# Patient Record
Sex: FEMALE | Race: WHITE | Employment: FULL TIME | URBAN - METROPOLITAN AREA
[De-identification: names, ages, dates, MRNs, and addresses within clinical notes are randomized per-mention and may not be internally consistent; named-entity substitution may affect disease eponyms.]

---

## 2017-02-24 ENCOUNTER — HOSPITAL ENCOUNTER (OUTPATIENT)
Dept: MAMMOGRAPHY | Age: 42
Discharge: HOME | End: 2017-02-24
Attending: INTERNAL MEDICINE
Payer: COMMERCIAL

## 2017-02-24 ENCOUNTER — HOSPITAL ENCOUNTER (OUTPATIENT)
Dept: ULTRASOUND IMAGING | Age: 42
Discharge: HOME | End: 2017-02-24
Attending: INTERNAL MEDICINE
Payer: COMMERCIAL

## 2017-02-24 DIAGNOSIS — N63.0 BREAST NODULE: ICD-10-CM

## 2017-02-24 PROCEDURE — 77066 DX MAMMO INCL CAD BI: CPT

## 2017-02-24 PROCEDURE — 77062 BREAST TOMOSYNTHESIS BI: CPT

## 2017-02-24 PROCEDURE — 76642 ULTRASOUND BREAST LIMITED: CPT

## 2017-03-02 ENCOUNTER — HOSPITAL ENCOUNTER (OUTPATIENT)
Dept: MAMMOGRAPHY | Age: 42
Discharge: HOME | End: 2017-03-02
Attending: INTERNAL MEDICINE
Payer: COMMERCIAL

## 2017-03-02 ENCOUNTER — HOSPITAL ENCOUNTER (OUTPATIENT)
Dept: ULTRASOUND IMAGING | Age: 42
Discharge: HOME | End: 2017-03-02
Attending: INTERNAL MEDICINE
Payer: COMMERCIAL

## 2017-03-02 DIAGNOSIS — N63.0 BREAST NODULE: ICD-10-CM

## 2017-03-02 PROCEDURE — 88305 TISSUE EXAM BY PATHOLOGIST: CPT | Performed by: INTERNAL MEDICINE

## 2017-03-02 PROCEDURE — 19083 BX BREAST 1ST LESION US IMAG: CPT

## 2017-03-02 PROCEDURE — 77065 DX MAMMO INCL CAD UNI: CPT

## 2017-03-02 NOTE — PROCEDURES
[facility] HOSP - [facility]  Procedure Note    Andrzej Funk Patient Status:  Outpatient    1975 MRN [id number]   Location [facility] [place] Attending Erwin Bray MD   Hosp Day # 0 PCP Inder Rankin MD     Procedure: Arelia Blessing

## 2017-03-06 ENCOUNTER — TELEPHONE (OUTPATIENT)
Dept: MAMMOGRAPHY | Facility: HOSPITAL | Age: 42
End: 2017-03-06

## 2017-03-06 NOTE — TELEPHONE ENCOUNTER
Telephoned patient regarding negative breast biopsy results. Biopsy site is healing well. Hematoma management discussed. Radiologist recommendation is to get another mammogram in 6 months for follow-up.  All questions answered and pt verbalized understand

## 2017-07-31 ENCOUNTER — OFFICE VISIT (OUTPATIENT)
Dept: INTERNAL MEDICINE CLINIC | Facility: CLINIC | Age: 42
End: 2017-07-31

## 2017-07-31 VITALS
RESPIRATION RATE: 18 BRPM | BODY MASS INDEX: 21.17 KG/M2 | TEMPERATURE: 98 F | DIASTOLIC BLOOD PRESSURE: 82 MMHG | SYSTOLIC BLOOD PRESSURE: 108 MMHG | HEIGHT: 62.5 IN | WEIGHT: 118 LBS | HEART RATE: 86 BPM

## 2017-07-31 DIAGNOSIS — Z23 NEED FOR TDAP VACCINATION: ICD-10-CM

## 2017-07-31 DIAGNOSIS — Z00.00 LABORATORY EXAMINATION ORDERED AS PART OF A ROUTINE GENERAL MEDICAL EXAMINATION: ICD-10-CM

## 2017-07-31 DIAGNOSIS — Z13.0 SCREENING, ANEMIA, DEFICIENCY, IRON: ICD-10-CM

## 2017-07-31 DIAGNOSIS — Z13.89 SCREENING FOR GENITOURINARY CONDITION: ICD-10-CM

## 2017-07-31 DIAGNOSIS — Z00.00 PHYSICAL EXAM, ANNUAL: Primary | ICD-10-CM

## 2017-07-31 DIAGNOSIS — K52.9 CHRONIC DIARRHEA: ICD-10-CM

## 2017-07-31 DIAGNOSIS — Z13.220 LIPID SCREENING: ICD-10-CM

## 2017-07-31 DIAGNOSIS — Z13.29 THYROID DISORDER SCREEN: ICD-10-CM

## 2017-07-31 PROBLEM — K90.0 CELIAC DISEASE: Status: ACTIVE | Noted: 2017-07-31

## 2017-07-31 PROBLEM — K90.0 CELIAC DISEASE (HCC): Status: ACTIVE | Noted: 2017-07-31

## 2017-07-31 PROCEDURE — 99395 PREV VISIT EST AGE 18-39: CPT | Performed by: INTERNAL MEDICINE

## 2017-07-31 PROCEDURE — 90471 IMMUNIZATION ADMIN: CPT | Performed by: INTERNAL MEDICINE

## 2017-07-31 PROCEDURE — 90715 TDAP VACCINE 7 YRS/> IM: CPT | Performed by: INTERNAL MEDICINE

## 2017-07-31 RX ORDER — CHOLESTYRAMINE 4 G/9G
4 POWDER, FOR SUSPENSION ORAL DAILY PRN
Qty: 90 EACH | Refills: 0 | Status: SHIPPED | OUTPATIENT
Start: 2017-07-31 | End: 2017-08-30

## 2017-07-31 NOTE — PROGRESS NOTES
HPI:    Patient ID: Mela Sorenson is a 46year old female. HPI  HPI:   Mela Sorenson is a 46year old female who presents for a complete physical exam. Symptoms: denies discharge, itching, burning or dysuria, periods are regular.  Patient file to calculate BMI.    GENERAL: well developed, well nourished,in no apparent distress  SKIN: no rashes,no suspicious lesions  HEENT: atraumatic, normocephalic,ears and throat are clear  EYES:PERRLA, EOMI, normal optic disk,conjunctiva are clear  NECK: s

## 2017-08-02 ENCOUNTER — LAB ENCOUNTER (OUTPATIENT)
Dept: LAB | Age: 42
End: 2017-08-02
Attending: INTERNAL MEDICINE
Payer: COMMERCIAL

## 2017-08-02 DIAGNOSIS — Z00.00 LABORATORY EXAMINATION ORDERED AS PART OF A ROUTINE GENERAL MEDICAL EXAMINATION: ICD-10-CM

## 2017-08-02 DIAGNOSIS — Z13.89 SCREENING FOR GENITOURINARY CONDITION: ICD-10-CM

## 2017-08-02 DIAGNOSIS — Z13.29 THYROID DISORDER SCREEN: ICD-10-CM

## 2017-08-02 DIAGNOSIS — Z13.220 LIPID SCREENING: ICD-10-CM

## 2017-08-02 DIAGNOSIS — R79.89 ELEVATED LFTS: ICD-10-CM

## 2017-08-02 DIAGNOSIS — K52.9 CHRONIC DIARRHEA: ICD-10-CM

## 2017-08-02 DIAGNOSIS — Z13.0 SCREENING, ANEMIA, DEFICIENCY, IRON: ICD-10-CM

## 2017-08-02 LAB
ALBUMIN SERPL-MCNC: 3.4 G/DL (ref 3.5–4.8)
ALP LIVER SERPL-CCNC: 113 U/L (ref 37–98)
ALT SERPL-CCNC: 109 U/L (ref 14–54)
AST SERPL-CCNC: 65 U/L (ref 15–41)
BASOPHILS # BLD AUTO: 0.03 X10(3) UL (ref 0–0.1)
BASOPHILS NFR BLD AUTO: 0.6 %
BILIRUB SERPL-MCNC: 0.3 MG/DL (ref 0.1–2)
BILIRUB UR QL STRIP.AUTO: NEGATIVE
BUN BLD-MCNC: 6 MG/DL (ref 8–20)
CALCIUM BLD-MCNC: 8.8 MG/DL (ref 8.3–10.3)
CHLORIDE: 102 MMOL/L (ref 101–111)
CHOLEST SMN-MCNC: 141 MG/DL (ref ?–200)
CLARITY UR REFRACT.AUTO: CLEAR
CO2: 30 MMOL/L (ref 22–32)
COLOR UR AUTO: YELLOW
CREAT BLD-MCNC: 0.66 MG/DL (ref 0.55–1.02)
EOSINOPHIL # BLD AUTO: 0.04 X10(3) UL (ref 0–0.3)
EOSINOPHIL NFR BLD AUTO: 0.8 %
ERYTHROCYTE [DISTWIDTH] IN BLOOD BY AUTOMATED COUNT: 12.7 % (ref 11.5–16)
EST. AVERAGE GLUCOSE BLD GHB EST-MCNC: 97 MG/DL (ref 68–126)
GLUCOSE BLD-MCNC: 80 MG/DL (ref 70–99)
GLUCOSE UR STRIP.AUTO-MCNC: NEGATIVE MG/DL
HBA1C MFR BLD HPLC: 5 % (ref ?–5.7)
HCT VFR BLD AUTO: 41.6 % (ref 34–50)
HDLC SERPL-MCNC: 60 MG/DL (ref 45–?)
HDLC SERPL: 2.35 {RATIO} (ref ?–4.44)
HGB BLD-MCNC: 13.5 G/DL (ref 12–16)
IMMATURE GRANULOCYTE COUNT: 0.01 X10(3) UL (ref 0–1)
IMMATURE GRANULOCYTE RATIO %: 0.2 %
IMMUNOGLOBULIN A: 147 MG/DL (ref 70–312)
KETONES UR STRIP.AUTO-MCNC: NEGATIVE MG/DL
LDLC SERPL CALC-MCNC: 64 MG/DL (ref ?–130)
LDLC SERPL-MCNC: 17 MG/DL (ref 5–40)
LEUKOCYTE ESTERASE UR QL STRIP.AUTO: NEGATIVE
LYMPHOCYTES # BLD AUTO: 1.45 X10(3) UL (ref 0.9–4)
LYMPHOCYTES NFR BLD AUTO: 30.5 %
M PROTEIN MFR SERPL ELPH: 6.8 G/DL (ref 6.1–8.3)
MCH RBC QN AUTO: 29 PG (ref 27–33.2)
MCHC RBC AUTO-ENTMCNC: 32.5 G/DL (ref 31–37)
MCV RBC AUTO: 89.3 FL (ref 81–100)
MONOCYTES # BLD AUTO: 0.58 X10(3) UL (ref 0.1–0.6)
MONOCYTES NFR BLD AUTO: 12.2 %
NEUTROPHIL ABS PRELIM: 2.65 X10 (3) UL (ref 1.3–6.7)
NEUTROPHILS # BLD AUTO: 2.65 X10(3) UL (ref 1.3–6.7)
NEUTROPHILS NFR BLD AUTO: 55.7 %
NITRITE UR QL STRIP.AUTO: NEGATIVE
NONHDLC SERPL-MCNC: 81 MG/DL (ref ?–130)
PH UR STRIP.AUTO: 7 [PH] (ref 4.5–8)
PLATELET # BLD AUTO: 206 10(3)UL (ref 150–450)
POTASSIUM SERPL-SCNC: 3.6 MMOL/L (ref 3.6–5.1)
PROT UR STRIP.AUTO-MCNC: NEGATIVE MG/DL
RBC # BLD AUTO: 4.66 X10(6)UL (ref 3.8–5.1)
RED CELL DISTRIBUTION WIDTH-SD: 41.3 FL (ref 35.1–46.3)
SED RATE-ML: 10 MM/HR (ref 0–25)
SODIUM SERPL-SCNC: 139 MMOL/L (ref 136–144)
SP GR UR STRIP.AUTO: 1.01 (ref 1–1.03)
TRIGLYCERIDES: 84 MG/DL (ref ?–150)
TSI SER-ACNC: 1.2 MIU/ML (ref 0.35–5.5)
UROBILINOGEN UR STRIP.AUTO-MCNC: <2 MG/DL
WBC # BLD AUTO: 4.8 X10(3) UL (ref 4–13)

## 2017-08-02 PROCEDURE — 83735 ASSAY OF MAGNESIUM: CPT

## 2017-08-02 PROCEDURE — 80061 LIPID PANEL: CPT

## 2017-08-02 PROCEDURE — 84999 UNLISTED CHEMISTRY PROCEDURE: CPT

## 2017-08-02 PROCEDURE — 82784 ASSAY IGA/IGD/IGG/IGM EACH: CPT

## 2017-08-02 PROCEDURE — 82705 FATS/LIPIDS FECES QUAL: CPT

## 2017-08-02 PROCEDURE — 36415 COLL VENOUS BLD VENIPUNCTURE: CPT

## 2017-08-02 PROCEDURE — 84302 ASSAY OF SWEAT SODIUM: CPT

## 2017-08-02 PROCEDURE — 83036 HEMOGLOBIN GLYCOSYLATED A1C: CPT

## 2017-08-02 PROCEDURE — 81001 URINALYSIS AUTO W/SCOPE: CPT

## 2017-08-02 PROCEDURE — 83516 IMMUNOASSAY NONANTIBODY: CPT

## 2017-08-02 PROCEDURE — 85025 COMPLETE CBC W/AUTO DIFF WBC: CPT

## 2017-08-02 PROCEDURE — 89055 LEUKOCYTE ASSESSMENT FECAL: CPT

## 2017-08-02 PROCEDURE — 86255 FLUORESCENT ANTIBODY SCREEN: CPT

## 2017-08-02 PROCEDURE — 82438 ASSAY OTHER FLUID CHLORIDES: CPT

## 2017-08-02 PROCEDURE — 82977 ASSAY OF GGT: CPT

## 2017-08-02 PROCEDURE — 84443 ASSAY THYROID STIM HORMONE: CPT

## 2017-08-02 PROCEDURE — 80053 COMPREHEN METABOLIC PANEL: CPT

## 2017-08-02 PROCEDURE — 85652 RBC SED RATE AUTOMATED: CPT

## 2017-08-03 ENCOUNTER — TELEPHONE (OUTPATIENT)
Dept: INTERNAL MEDICINE CLINIC | Facility: CLINIC | Age: 42
End: 2017-08-03

## 2017-08-03 DIAGNOSIS — R79.89 ELEVATED LFTS: Primary | ICD-10-CM

## 2017-08-03 LAB
GAMMA GLUTAMYL TRANSFERASE: 54 U/L (ref 5–55)
GLIAD (DEAMIDATED) AB, IGA: NEGATIVE
GLIAD (DEAMIDATED) AB, IGG: NEGATIVE
TISSUE TRANSGLUTAMINASE AB,IGA: 1.2 U/ML (ref ?–15)
TISSUE TRANSGLUTAMINASE IGA QUALITATIVE: NEGATIVE

## 2017-08-03 NOTE — TELEPHONE ENCOUNTER
Spoke with patient and she is requesting a referral for a different GI group. States the soonest [facility] can get her [facility] was end of August, and she does not want to miss days off work. States they do not have evening appointments either.      Information

## 2017-08-03 NOTE — TELEPHONE ENCOUNTER
Spoke with Princess WHITLEY added on. D/w pt results and recommendations. She expressed understanding. Orders placed.

## 2017-08-03 NOTE — TELEPHONE ENCOUNTER
----- Message from Royal Galindo MD sent at 8/3/2017 10:55 AM CDT -----  Sed rate is normal suggesting no inflammation  Cbc is normal, reflects no infection  UA us bland  No d2. Renal functions are normal  Elevated LFT. Check ggt.  Have pt keep appt with GI

## 2017-08-03 NOTE — TELEPHONE ENCOUNTER
Patient called and stated that she was seen by Dr Leann Macias, and the directions that were given based on her test results are not working. Please advise.

## 2017-08-04 LAB
NEUTRAL FAT, FECAL: NORMAL
SPLIT FAT, FECAL: NORMAL

## 2017-08-05 ENCOUNTER — HOSPITAL ENCOUNTER (OUTPATIENT)
Dept: ULTRASOUND IMAGING | Age: 42
Discharge: HOME | End: 2017-08-05
Attending: INTERNAL MEDICINE
Payer: COMMERCIAL

## 2017-08-05 DIAGNOSIS — R79.89 ELEVATED LFTS: ICD-10-CM

## 2017-08-05 PROCEDURE — 76700 US EXAM ABDOM COMPLETE: CPT | Performed by: INTERNAL MEDICINE

## 2017-08-07 ENCOUNTER — TELEPHONE (OUTPATIENT)
Dept: INTERNAL MEDICINE CLINIC | Facility: CLINIC | Age: 42
End: 2017-08-07

## 2017-08-07 DIAGNOSIS — K52.9 CHRONIC DIARRHEA: Primary | ICD-10-CM

## 2017-08-07 NOTE — TELEPHONE ENCOUNTER
Per Dr. Rubi Mark repeat electrolytes stool with watery diarrhea sample. D/w pt she expressed understanding. Order placed.

## 2017-08-07 NOTE — TELEPHONE ENCOUNTER
----- Message from Miles Randall MD sent at 8/5/2017 11:41 AM CDT -----  Neg for celiac  Stool lytes are pending

## 2017-08-09 ENCOUNTER — APPOINTMENT (OUTPATIENT)
Dept: LAB | Age: 42
End: 2017-08-09
Attending: INTERNAL MEDICINE
Payer: COMMERCIAL

## 2017-08-09 DIAGNOSIS — K52.9 CHRONIC DIARRHEA: ICD-10-CM

## 2017-08-09 PROCEDURE — 83735 ASSAY OF MAGNESIUM: CPT

## 2017-08-09 PROCEDURE — 81383 HLA II TYPING 1 ALLELE HR: CPT | Performed by: INTERNAL MEDICINE

## 2017-08-09 PROCEDURE — 84999 UNLISTED CHEMISTRY PROCEDURE: CPT

## 2017-08-09 PROCEDURE — 82438 ASSAY OTHER FLUID CHLORIDES: CPT

## 2017-08-09 PROCEDURE — 81376 HLA II TYPING 1 LOCUS LR: CPT | Performed by: INTERNAL MEDICINE

## 2017-08-09 PROCEDURE — 84302 ASSAY OF SWEAT SODIUM: CPT

## 2017-08-10 RX ORDER — SODIUM CHLORIDE, SODIUM LACTATE, POTASSIUM CHLORIDE, CALCIUM CHLORIDE 600; 310; 30; 20 MG/100ML; MG/100ML; MG/100ML; MG/100ML
INJECTION, SOLUTION INTRAVENOUS CONTINUOUS
Status: CANCELLED | OUTPATIENT
Start: 2017-08-10

## 2017-08-11 ENCOUNTER — HOSPITAL ENCOUNTER (OUTPATIENT)
Facility: HOSPITAL | Age: 42
Setting detail: HOSPITAL OUTPATIENT SURGERY
Discharge: HOME | End: 2017-08-11
Attending: INTERNAL MEDICINE | Admitting: INTERNAL MEDICINE
Payer: COMMERCIAL

## 2017-08-11 ENCOUNTER — SURGERY (OUTPATIENT)
Age: 42
End: 2017-08-11

## 2017-08-11 VITALS
TEMPERATURE: 98 F | HEART RATE: 62 BPM | SYSTOLIC BLOOD PRESSURE: 99 MMHG | WEIGHT: 118 LBS | OXYGEN SATURATION: 100 % | DIASTOLIC BLOOD PRESSURE: 61 MMHG | RESPIRATION RATE: 16 BRPM | BODY MASS INDEX: 20.91 KG/M2 | HEIGHT: 63 IN

## 2017-08-11 DIAGNOSIS — K52.9 CHRONIC DIARRHEA: ICD-10-CM

## 2017-08-11 DIAGNOSIS — K90.0 CELIAC DISEASE: ICD-10-CM

## 2017-08-11 LAB
CHLORIDE, STOOL: 29 MMOL/L
POCT LOT NUMBER: NORMAL
POCT URINE PREGNANCY: NEGATIVE
POTASSIUM, FECAL: 75 MMOL/L
PROCEDURE CONTROL: NORMAL
SODIUM, FECAL: 66 MMOL/L

## 2017-08-11 PROCEDURE — 88305 TISSUE EXAM BY PATHOLOGIST: CPT | Performed by: INTERNAL MEDICINE

## 2017-08-11 PROCEDURE — 0DB58ZX EXCISION OF ESOPHAGUS, VIA NATURAL OR ARTIFICIAL OPENING ENDOSCOPIC, DIAGNOSTIC: ICD-10-PCS | Performed by: INTERNAL MEDICINE

## 2017-08-11 PROCEDURE — 0DB68ZX EXCISION OF STOMACH, VIA NATURAL OR ARTIFICIAL OPENING ENDOSCOPIC, DIAGNOSTIC: ICD-10-PCS | Performed by: INTERNAL MEDICINE

## 2017-08-11 PROCEDURE — 81025 URINE PREGNANCY TEST: CPT | Performed by: INTERNAL MEDICINE

## 2017-08-11 PROCEDURE — 0DBE8ZX EXCISION OF LARGE INTESTINE, VIA NATURAL OR ARTIFICIAL OPENING ENDOSCOPIC, DIAGNOSTIC: ICD-10-PCS | Performed by: INTERNAL MEDICINE

## 2017-08-11 PROCEDURE — 0DB98ZX EXCISION OF DUODENUM, VIA NATURAL OR ARTIFICIAL OPENING ENDOSCOPIC, DIAGNOSTIC: ICD-10-PCS | Performed by: INTERNAL MEDICINE

## 2017-08-11 RX ORDER — SODIUM CHLORIDE, SODIUM LACTATE, POTASSIUM CHLORIDE, CALCIUM CHLORIDE 600; 310; 30; 20 MG/100ML; MG/100ML; MG/100ML; MG/100ML
INJECTION, SOLUTION INTRAVENOUS CONTINUOUS
Status: DISCONTINUED | OUTPATIENT
Start: 2017-08-11 | End: 2017-08-11

## 2017-08-11 RX ORDER — MIDAZOLAM HYDROCHLORIDE 1 MG/ML
INJECTION INTRAMUSCULAR; INTRAVENOUS
Status: DISCONTINUED | OUTPATIENT
Start: 2017-08-11 | End: 2017-08-11

## 2017-08-11 NOTE — OPERATIVE REPORT
PATIENT NAME: Shreyas Gonzalez  MRN: [id number]  DATE OF OPERATION: 8/11/2017  REFERRING PHYSICIAN: Dr. Evon Davenport  Medications:  Versed 7 mg IVP              Fentanyl 75 mcg IVP  DATE OF LAST COLONOSCOPY:  none  PREOPERATIVE DIAGNOSIS:  Abdominal pain  HX of kaylin disease. Biopsies were done. The gastroscope was removed from the patient. The procedure was completed. The patient tolerated the procedure well. The patient was turned around and a colonoscopy was performed.   An awake rectal exam was normal with nor

## 2017-08-11 NOTE — H&P
H and P Update    The history obtained by Dr. Yohannes Lundberg dated 8/9/17, has no changes.     GENERAL: well developed, well nourished, in no apparent distress  HEENT: atraumatic, normocephalic, ears and throat are clear  NECK: supple,

## 2017-08-14 ENCOUNTER — TELEPHONE (OUTPATIENT)
Dept: INTERNAL MEDICINE CLINIC | Facility: CLINIC | Age: 42
End: 2017-08-14

## 2017-08-14 DIAGNOSIS — K52.9 CHRONIC DIARRHEA: Primary | ICD-10-CM

## 2017-08-14 NOTE — TELEPHONE ENCOUNTER
----- Message from Joan Araujo MD sent at 8/12/2017  8:10 AM CDT -----  Stool osmolarity is less than 50 (calculated hers is 8). This points to a secretory cause of diarrhea. Has her diarrhea improved with cholestyramine?   If it hasn't then check gastrin

## 2017-08-14 NOTE — TELEPHONE ENCOUNTER
Spoke with patient and she states she followed up with GI and she was instructed to not take the Cholestyramine but rather take the imodium as needed.  She had an endoscopy/colonoscopy and it was normal. States gluten free diet makes symptoms better, but sh

## 2017-08-15 NOTE — PROGRESS NOTES
The colon and stomach biopsies were normal.  The esophageal biopsies show patchy areas of inflammation with up to 25 eosinophils per high power field. These changes are likely related to gastroesophageal reflux rather than eosinophilic esophagitis.     The

## 2017-08-19 ENCOUNTER — APPOINTMENT (OUTPATIENT)
Dept: LAB | Facility: HOSPITAL | Age: 42
End: 2017-08-19
Attending: INTERNAL MEDICINE
Payer: COMMERCIAL

## 2017-08-19 DIAGNOSIS — K52.9 CHRONIC DIARRHEA: ICD-10-CM

## 2017-08-19 PROCEDURE — 84586 ASSAY OF VIP: CPT

## 2017-08-19 PROCEDURE — 82941 ASSAY OF GASTRIN: CPT

## 2017-08-19 PROCEDURE — 84307 ASSAY OF SOMATOSTATIN: CPT

## 2017-08-19 PROCEDURE — 82308 ASSAY OF CALCITONIN: CPT

## 2017-08-19 PROCEDURE — 36415 COLL VENOUS BLD VENIPUNCTURE: CPT

## 2017-08-20 LAB
CALCITONIN: <2 PG/ML
GASTRIN LEVEL: 14 PG/ML

## 2017-09-02 LAB — Lab: 21 PG/ML

## 2017-09-11 ENCOUNTER — HOSPITAL ENCOUNTER (OUTPATIENT)
Dept: MAMMOGRAPHY | Age: 42
Discharge: HOME | End: 2017-09-11
Attending: INTERNAL MEDICINE
Payer: COMMERCIAL

## 2017-09-11 DIAGNOSIS — Z87.898 HISTORY OF LUMP IN BREAST: ICD-10-CM

## 2017-09-11 DIAGNOSIS — R92.8 ABNORMAL MAMMOGRAM: ICD-10-CM

## 2017-09-11 LAB — VASOACTIVE INTESTINAL POLYPEPTIDE: <13 PG/ML

## 2017-09-11 PROCEDURE — 77065 DX MAMMO INCL CAD UNI: CPT | Performed by: INTERNAL MEDICINE

## 2017-09-11 PROCEDURE — 77061 BREAST TOMOSYNTHESIS UNI: CPT | Performed by: INTERNAL MEDICINE

## 2017-09-11 NOTE — PROGRESS NOTES
Per hippa, left detailed message for pt informing of results wnl. Pt is to call the office back with any further questions or concerns.

## 2018-09-13 ENCOUNTER — OFFICE VISIT (OUTPATIENT)
Dept: INTERNAL MEDICINE CLINIC | Facility: CLINIC | Age: 43
End: 2018-09-13

## 2018-09-13 VITALS
TEMPERATURE: 98 F | HEART RATE: 76 BPM | BODY MASS INDEX: 20.32 KG/M2 | RESPIRATION RATE: 16 BRPM | OXYGEN SATURATION: 98 % | HEIGHT: 64 IN | WEIGHT: 119 LBS | SYSTOLIC BLOOD PRESSURE: 116 MMHG | DIASTOLIC BLOOD PRESSURE: 68 MMHG

## 2018-09-13 DIAGNOSIS — F34.1 DYSTHYMIA: ICD-10-CM

## 2018-09-13 DIAGNOSIS — Z13.89 SCREENING FOR GENITOURINARY CONDITION: ICD-10-CM

## 2018-09-13 DIAGNOSIS — Z00.00 ANNUAL PHYSICAL EXAM: Primary | ICD-10-CM

## 2018-09-13 DIAGNOSIS — Z28.21 INFLUENZA VACCINATION DECLINED BY PATIENT: ICD-10-CM

## 2018-09-13 DIAGNOSIS — Z00.00 LABORATORY EXAMINATION ORDERED AS PART OF A ROUTINE GENERAL MEDICAL EXAMINATION: ICD-10-CM

## 2018-09-13 DIAGNOSIS — Z13.29 THYROID DISORDER SCREEN: ICD-10-CM

## 2018-09-13 DIAGNOSIS — Z13.0 SCREENING, IRON DEFICIENCY ANEMIA: ICD-10-CM

## 2018-09-13 DIAGNOSIS — R60.0 BILATERAL LEG EDEMA: ICD-10-CM

## 2018-09-13 DIAGNOSIS — Z13.220 LIPID SCREENING: ICD-10-CM

## 2018-09-13 DIAGNOSIS — K20.0 EOSINOPHILIC ESOPHAGITIS: ICD-10-CM

## 2018-09-13 PROBLEM — K52.9 CHRONIC DIARRHEA: Status: RESOLVED | Noted: 2017-07-31 | Resolved: 2018-09-13

## 2018-09-13 PROCEDURE — 99386 PREV VISIT NEW AGE 40-64: CPT | Performed by: INTERNAL MEDICINE

## 2018-09-13 RX ORDER — HYDROCHLOROTHIAZIDE 12.5 MG/1
12.5 TABLET ORAL DAILY
Qty: 30 TABLET | Refills: 0 | Status: SHIPPED | OUTPATIENT
Start: 2018-09-13 | End: 2018-10-10

## 2018-09-13 RX ORDER — ESCITALOPRAM OXALATE 10 MG/1
10 TABLET ORAL DAILY
Qty: 30 TABLET | Refills: 0 | Status: SHIPPED | OUTPATIENT
Start: 2018-09-13 | End: 2018-10-08

## 2018-09-13 NOTE — PROGRESS NOTES
HPI:    Patient ID: Valentina Veliz is a 38year old female. HPI  HPI:   Valentina Veliz is a 38year old female who presents for a complete physical exam. Symptoms: denies discharge, itching, burning or dysuria, no menses due to Mirena IUD.  Patient History:  , :       Comment:  two  2017: COLONOSCOPY; N/A      Comment:  Procedure: COLONOSCOPY;  Surgeon: Adan Bumpers, MD;                 Location: [address] [place] [address] ENDOSCOPY  2017: COLONOSCOPY; N/A      Comment:  Performed by Romero Moya atraumatic, normocephalic,ears and throat are clear  EYES:PERRLA, EOMI, normal optic disk,conjunctiva are clear  NECK: supple,no adenopathy,no bruits  LUNGS: clear to auscultation  CARDIO: RRR without murmur  GI: good BS's,no masses, HSM or tenderness  : ASSESSMENT/PLAN:   Lipid screening  Screening for genitourinary condition  Annual physical exam  (primary encounter diagnosis)  Screening, iron deficiency anemia  Laboratory examination ordered as part of a routine general medical examination  Thyroid di

## 2018-09-13 NOTE — PATIENT INSTRUCTIONS
Prevention Guidelines, Women Ages 36 to 52  Screening tests and vaccines are an important part of managing your health. A screening test is done to find possible disorders or diseases in people who don't have any symptoms.  The goal is to find a disease e Depression All women in this age group At routine exams   Gonorrhea Sexually active women at increased risk for infection At routine exams   Hepatitis C Anyone at increased risk; 1 time for those born between [facility] At routine exams   High cholester Meningococcal Women at increased risk for infection–talk with your healthcare provider 1 or more doses   Pneumococcal conjugate vaccine (PCV13) and pneumococcal polysaccharide vaccine (PPSV23) Women at increased risk for infection–talk with your healthcare

## 2018-09-15 ENCOUNTER — LAB ENCOUNTER (OUTPATIENT)
Dept: LAB | Age: 43
End: 2018-09-15
Attending: INTERNAL MEDICINE
Payer: COMMERCIAL

## 2018-09-15 DIAGNOSIS — Z13.220 LIPID SCREENING: ICD-10-CM

## 2018-09-15 DIAGNOSIS — Z00.00 LABORATORY EXAMINATION ORDERED AS PART OF A ROUTINE GENERAL MEDICAL EXAMINATION: ICD-10-CM

## 2018-09-15 DIAGNOSIS — Z13.29 THYROID DISORDER SCREEN: ICD-10-CM

## 2018-09-15 DIAGNOSIS — Z13.89 SCREENING FOR GENITOURINARY CONDITION: ICD-10-CM

## 2018-09-15 DIAGNOSIS — Z13.0 SCREENING, IRON DEFICIENCY ANEMIA: ICD-10-CM

## 2018-09-15 LAB
ALBUMIN SERPL-MCNC: 4 G/DL (ref 3.5–4.8)
ALBUMIN/GLOB SERPL: 1.1 {RATIO} (ref 1–2)
ALP LIVER SERPL-CCNC: 87 U/L (ref 37–98)
ALT SERPL-CCNC: 18 U/L (ref 14–54)
ANION GAP SERPL CALC-SCNC: 4 MMOL/L (ref 0–18)
AST SERPL-CCNC: 15 U/L (ref 15–41)
BASOPHILS # BLD AUTO: 0.02 X10(3) UL (ref 0–0.1)
BASOPHILS NFR BLD AUTO: 0.3 %
BILIRUB SERPL-MCNC: 0.5 MG/DL (ref 0.1–2)
BILIRUB UR QL STRIP.AUTO: NEGATIVE
BUN BLD-MCNC: 12 MG/DL (ref 8–20)
BUN/CREAT SERPL: 13.8 (ref 10–20)
CALCIUM BLD-MCNC: 9.1 MG/DL (ref 8.3–10.3)
CHLORIDE SERPL-SCNC: 105 MMOL/L (ref 101–111)
CHOLEST SMN-MCNC: 158 MG/DL (ref ?–200)
CO2 SERPL-SCNC: 29 MMOL/L (ref 22–32)
COLOR UR AUTO: YELLOW
CREAT BLD-MCNC: 0.87 MG/DL (ref 0.55–1.02)
EOSINOPHIL # BLD AUTO: 0.06 X10(3) UL (ref 0–0.3)
EOSINOPHIL NFR BLD AUTO: 0.8 %
ERYTHROCYTE [DISTWIDTH] IN BLOOD BY AUTOMATED COUNT: 12.4 % (ref 11.5–16)
EST. AVERAGE GLUCOSE BLD GHB EST-MCNC: 97 MG/DL (ref 68–126)
GLOBULIN PLAS-MCNC: 3.5 G/DL (ref 2.5–4)
GLUCOSE BLD-MCNC: 88 MG/DL (ref 70–99)
GLUCOSE UR STRIP.AUTO-MCNC: NEGATIVE MG/DL
HBA1C MFR BLD HPLC: 5 % (ref ?–5.7)
HCT VFR BLD AUTO: 47 % (ref 34–50)
HDLC SERPL-MCNC: 65 MG/DL (ref 40–59)
HGB BLD-MCNC: 15 G/DL (ref 12–16)
IMMATURE GRANULOCYTE COUNT: 0.02 X10(3) UL (ref 0–1)
IMMATURE GRANULOCYTE RATIO %: 0.3 %
KETONES UR STRIP.AUTO-MCNC: NEGATIVE MG/DL
LDLC SERPL CALC-MCNC: 83 MG/DL (ref ?–100)
LEUKOCYTE ESTERASE UR QL STRIP.AUTO: NEGATIVE
LYMPHOCYTES # BLD AUTO: 1.48 X10(3) UL (ref 0.9–4)
LYMPHOCYTES NFR BLD AUTO: 20.6 %
M PROTEIN MFR SERPL ELPH: 7.5 G/DL (ref 6.1–8.3)
MCH RBC QN AUTO: 29.7 PG (ref 27–33.2)
MCHC RBC AUTO-ENTMCNC: 31.9 G/DL (ref 31–37)
MCV RBC AUTO: 93.1 FL (ref 81–100)
MONOCYTES # BLD AUTO: 0.61 X10(3) UL (ref 0.1–1)
MONOCYTES NFR BLD AUTO: 8.5 %
NEUTROPHIL ABS PRELIM: 5 X10 (3) UL (ref 1.3–6.7)
NEUTROPHILS # BLD AUTO: 5 X10(3) UL (ref 1.3–6.7)
NEUTROPHILS NFR BLD AUTO: 69.5 %
NITRITE UR QL STRIP.AUTO: NEGATIVE
NONHDLC SERPL-MCNC: 93 MG/DL (ref ?–130)
OSMOLALITY SERPL CALC.SUM OF ELEC: 285 MOSM/KG (ref 275–295)
PH UR STRIP.AUTO: 6 [PH] (ref 4.5–8)
PLATELET # BLD AUTO: 221 10(3)UL (ref 150–450)
POTASSIUM SERPL-SCNC: 3.9 MMOL/L (ref 3.6–5.1)
PROT UR STRIP.AUTO-MCNC: NEGATIVE MG/DL
RBC # BLD AUTO: 5.05 X10(6)UL (ref 3.8–5.1)
RBC UR QL AUTO: NEGATIVE
RED CELL DISTRIBUTION WIDTH-SD: 42.5 FL (ref 35.1–46.3)
SODIUM SERPL-SCNC: 138 MMOL/L (ref 136–144)
SP GR UR STRIP.AUTO: 1.02 (ref 1–1.03)
TRIGL SERPL-MCNC: 52 MG/DL (ref 30–149)
TSI SER-ACNC: 1.1 MIU/ML (ref 0.35–5.5)
UROBILINOGEN UR STRIP.AUTO-MCNC: <2 MG/DL
VLDLC SERPL CALC-MCNC: 10 MG/DL (ref 0–30)
WBC # BLD AUTO: 7.2 X10(3) UL (ref 4–13)

## 2018-09-15 PROCEDURE — 80053 COMPREHEN METABOLIC PANEL: CPT

## 2018-09-15 PROCEDURE — 83036 HEMOGLOBIN GLYCOSYLATED A1C: CPT

## 2018-09-15 PROCEDURE — 36415 COLL VENOUS BLD VENIPUNCTURE: CPT

## 2018-09-15 PROCEDURE — 85025 COMPLETE CBC W/AUTO DIFF WBC: CPT

## 2018-09-15 PROCEDURE — 84443 ASSAY THYROID STIM HORMONE: CPT

## 2018-09-15 PROCEDURE — 81003 URINALYSIS AUTO W/O SCOPE: CPT

## 2018-09-15 PROCEDURE — 80061 LIPID PANEL: CPT

## 2018-10-08 DIAGNOSIS — F34.1 DYSTHYMIA: ICD-10-CM

## 2018-10-08 RX ORDER — ESCITALOPRAM OXALATE 10 MG/1
TABLET ORAL
Qty: 30 TABLET | Refills: 0 | Status: SHIPPED | OUTPATIENT
Start: 2018-10-08 | End: 2018-11-07

## 2018-10-10 DIAGNOSIS — R60.0 BILATERAL LEG EDEMA: ICD-10-CM

## 2018-10-10 RX ORDER — HYDROCHLOROTHIAZIDE 12.5 MG/1
TABLET ORAL
Qty: 30 TABLET | Refills: 0 | Status: SHIPPED | OUTPATIENT
Start: 2018-10-10 | End: 2018-12-22

## 2018-10-23 PROBLEM — Z98.891 HISTORY OF C-SECTION: Status: ACTIVE | Noted: 2018-10-23

## 2018-10-23 PROBLEM — Z98.890 HISTORY OF D&C: Status: ACTIVE | Noted: 2018-10-23

## 2018-10-23 PROBLEM — E28.2 PCOS (POLYCYSTIC OVARIAN SYNDROME): Status: ACTIVE | Noted: 2018-10-23

## 2018-11-01 ENCOUNTER — HOSPITAL ENCOUNTER (OUTPATIENT)
Dept: MAMMOGRAPHY | Age: 43
Discharge: HOME | End: 2018-11-01
Attending: NURSE PRACTITIONER
Payer: COMMERCIAL

## 2018-11-01 DIAGNOSIS — Z12.31 VISIT FOR SCREENING MAMMOGRAM: ICD-10-CM

## 2018-11-01 PROCEDURE — 77063 BREAST TOMOSYNTHESIS BI: CPT | Performed by: NURSE PRACTITIONER

## 2018-11-01 PROCEDURE — 77067 SCR MAMMO BI INCL CAD: CPT | Performed by: NURSE PRACTITIONER

## 2018-11-07 DIAGNOSIS — F34.1 DYSTHYMIA: ICD-10-CM

## 2018-11-07 RX ORDER — ESCITALOPRAM OXALATE 10 MG/1
TABLET ORAL
Qty: 30 TABLET | Refills: 0 | Status: SHIPPED | OUTPATIENT
Start: 2018-11-07 | End: 2018-12-06

## 2018-11-19 ENCOUNTER — HOSPITAL ENCOUNTER (OUTPATIENT)
Dept: MAMMOGRAPHY | Age: 43
Discharge: HOME | End: 2018-11-19
Attending: NURSE PRACTITIONER
Payer: COMMERCIAL

## 2018-11-19 ENCOUNTER — HOSPITAL ENCOUNTER (OUTPATIENT)
Dept: ULTRASOUND IMAGING | Age: 43
Discharge: HOME | End: 2018-11-19
Attending: NURSE PRACTITIONER
Payer: COMMERCIAL

## 2018-11-19 DIAGNOSIS — R92.2 INCONCLUSIVE MAMMOGRAM: ICD-10-CM

## 2018-11-19 PROCEDURE — 77066 DX MAMMO INCL CAD BI: CPT | Performed by: NURSE PRACTITIONER

## 2018-11-19 PROCEDURE — 77062 BREAST TOMOSYNTHESIS BI: CPT | Performed by: NURSE PRACTITIONER

## 2018-11-19 PROCEDURE — 76642 ULTRASOUND BREAST LIMITED: CPT | Performed by: NURSE PRACTITIONER

## 2018-11-20 ENCOUNTER — TELEPHONE (OUTPATIENT)
Dept: INTERNAL MEDICINE CLINIC | Facility: CLINIC | Age: 43
End: 2018-11-20

## 2018-11-20 NOTE — TELEPHONE ENCOUNTER
Reviewed results from the diagnostic mammogram:     Findings and recommendations were discussed with the patient. In further discussion she notes at the right mass is palpable and was the size of a pea.   She agrees with surgical consultation for excisiona

## 2018-11-20 NOTE — TELEPHONE ENCOUNTER
VOICEMAIL: pt has some question about a mammogram and ultra sound she is planning to schedule, call back

## 2018-12-06 DIAGNOSIS — F34.1 DYSTHYMIA: ICD-10-CM

## 2018-12-06 RX ORDER — ESCITALOPRAM OXALATE 10 MG/1
TABLET ORAL
Qty: 30 TABLET | Refills: 0 | Status: SHIPPED | OUTPATIENT
Start: 2018-12-06 | End: 2018-12-22

## 2018-12-06 NOTE — TELEPHONE ENCOUNTER
Last refill was sent on 11/7/2018.  The pt was seen in the office on 9/13/2018 for a physical:       Fatigue/irritablity due to mirena vs dysthymia pt will discuss with gyne regarding removal. Trial of lexapro as ordered        The pt was reminded with the

## 2018-12-10 ENCOUNTER — OFFICE VISIT (OUTPATIENT)
Dept: SURGERY | Facility: CLINIC | Age: 43
End: 2018-12-10

## 2018-12-10 VITALS
WEIGHT: 116 LBS | HEART RATE: 69 BPM | DIASTOLIC BLOOD PRESSURE: 75 MMHG | RESPIRATION RATE: 16 BRPM | BODY MASS INDEX: 20 KG/M2 | SYSTOLIC BLOOD PRESSURE: 116 MMHG

## 2018-12-10 DIAGNOSIS — D24.1 BREAST FIBROADENOMA IN FEMALE, RIGHT: Primary | ICD-10-CM

## 2018-12-10 PROCEDURE — 99243 OFF/OP CNSLTJ NEW/EST LOW 30: CPT | Performed by: SURGERY

## 2018-12-10 NOTE — H&P
New Patient Visit Note       Active Problems      1.  Breast fibroadenoma in female, right        Chief Complaint   Patient presents with:  Abnormal Mammogram      History of Present Illness   The patient is a 57-year-old female seen at the request of her rabia history have been reviewed by me today.     Family History   Problem Relation Age of Onset   • Diabetes Father    • Other (neuroendocrine tumor) Father    • Hypertension Mother    • Hypertension Brother      Social History    Socioeconomic History      Meghann Field for adenopathy. Does not bruise/bleed easily. Psychiatric/Behavioral: Negative for behavioral problems and sleep disturbance.        Physical Findings   /75   Pulse 69   Resp 16   Wt 116 lb   BMI 19.91 kg/m²   Physical Exam   Constitutional: She is Neurological: She is alert and oriented to person, place, and time. Skin: Skin is intact. She is not diaphoretic. Psychiatric: She has a normal mood and affect. Her speech is normal and behavior is normal.   Nursing note and vitals reviewed. results in lay terms has been sent to the patient. This exam was evaluated with a computer-aided device.   This patient's information has been entered into a reminder system with a target due date for the next mammogram.     Dictated by: Armida Vizcarra MD on

## 2018-12-11 ENCOUNTER — TELEPHONE (OUTPATIENT)
Dept: SURGERY | Facility: CLINIC | Age: 43
End: 2018-12-11

## 2018-12-11 DIAGNOSIS — N63.10 BREAST MASS, RIGHT: Primary | ICD-10-CM

## 2018-12-22 ENCOUNTER — OFFICE VISIT (OUTPATIENT)
Dept: INTERNAL MEDICINE CLINIC | Facility: CLINIC | Age: 43
End: 2018-12-22

## 2018-12-22 VITALS
HEART RATE: 70 BPM | OXYGEN SATURATION: 98 % | TEMPERATURE: 98 F | WEIGHT: 116 LBS | DIASTOLIC BLOOD PRESSURE: 62 MMHG | SYSTOLIC BLOOD PRESSURE: 110 MMHG | RESPIRATION RATE: 18 BRPM | BODY MASS INDEX: 19.81 KG/M2 | HEIGHT: 64 IN

## 2018-12-22 DIAGNOSIS — F34.1 DYSTHYMIA: ICD-10-CM

## 2018-12-22 PROCEDURE — 99213 OFFICE O/P EST LOW 20 MIN: CPT | Performed by: NURSE PRACTITIONER

## 2018-12-22 RX ORDER — ESCITALOPRAM OXALATE 10 MG/1
10 TABLET ORAL
Qty: 90 TABLET | Refills: 1 | Status: SHIPPED | OUTPATIENT
Start: 2018-12-22 | End: 2019-06-20

## 2018-12-22 NOTE — PROGRESS NOTES
HPI:    Patient ID: Trisha Gan is a 38year old female. Patient presents with: Anxiety: Lexapro refill      Anxiety   This is a recurrent problem. The current episode started more than 1 month ago. The problem occurs daily.  The problem has been gr 10 MG Oral Tab Take 1 tablet (10 mg total) by mouth once daily. Disp: 90 tablet Rfl: 1   Multivitamin Chewtab, ADULT, Oral Chew Tab Chew 1 tablet by mouth daily.  Disp:  Rfl:    Omeprazole 40 MG Oral Capsule Delayed Release Take 1 capsule (40 mg total) by m SpO2 98%   BMI 19.91 kg/m²   Physical Exam   Vitals reviewed. Constitutional: She is oriented to person, place, and time. She appears well-developed and well-nourished.    Cardiovascular: Normal rate, regular rhythm, normal heart sounds and intact distal

## 2018-12-27 ENCOUNTER — TELEPHONE (OUTPATIENT)
Dept: INTERNAL MEDICINE CLINIC | Facility: CLINIC | Age: 43
End: 2018-12-27

## 2018-12-27 DIAGNOSIS — R05.9 COUGH: Primary | ICD-10-CM

## 2018-12-27 RX ORDER — BENZONATATE 200 MG/1
200 CAPSULE ORAL 3 TIMES DAILY PRN
Qty: 30 CAPSULE | Refills: 0 | Status: SHIPPED | OUTPATIENT
Start: 2018-12-27 | End: 2019-01-17

## 2018-12-27 NOTE — TELEPHONE ENCOUNTER
LOV 12/22/18. The pt states children have been sick over the holidays with high fevers. The pt's symptoms cough, fever of 101, fatigue and a sore throat began 3 days ago. The pt is requesting an Rx for a cough suppressant.      Per Dr. Mike Severino, Rx

## 2018-12-27 NOTE — TELEPHONE ENCOUNTER
Patient called and stated she has a cough, and requesting a cough suppressant to be filled at her pharmacy on file.

## 2019-01-23 PROCEDURE — 88305 TISSUE EXAM BY PATHOLOGIST: CPT | Performed by: SURGERY

## 2019-01-30 ENCOUNTER — OFFICE VISIT (OUTPATIENT)
Dept: SURGERY | Facility: CLINIC | Age: 44
End: 2019-01-30

## 2019-01-30 VITALS
SYSTOLIC BLOOD PRESSURE: 115 MMHG | WEIGHT: 116 LBS | DIASTOLIC BLOOD PRESSURE: 78 MMHG | BODY MASS INDEX: 19.81 KG/M2 | HEIGHT: 64 IN | TEMPERATURE: 97 F | HEART RATE: 64 BPM

## 2019-01-30 DIAGNOSIS — R92.0 MICROCALCIFICATION OF LEFT BREAST ON MAMMOGRAM: ICD-10-CM

## 2019-01-30 DIAGNOSIS — D24.1 BREAST FIBROADENOMA IN FEMALE, RIGHT: Primary | ICD-10-CM

## 2019-01-30 PROCEDURE — 99024 POSTOP FOLLOW-UP VISIT: CPT | Performed by: SURGERY

## 2019-01-30 NOTE — PROGRESS NOTES
Post Operative Visit Note       Active Problems  1. Breast fibroadenoma in female, right    2.  Microcalcification of left breast on mammogram         Chief Complaint   Patient presents with:  Breast Lump: p/o excisional R.  breast biopsy 1/23/19 - still so Smokeless tobacco: Never Used    Substance and Sexual Activity      Alcohol use: Yes        Frequency: Monthly or less        Comment: rarely      Drug use: No    Other Topics      Concerns:       Current Outpatient Medications:   •  acetaminophen 500 M well-developed and well-nourished. No distress. HENT:   Head: Normocephalic and atraumatic. Eyes: Conjunctivae and EOM are normal. Pupils are equal, round, and reactive to light. No scleral icterus.    Neck: Trachea normal and full passive range of gage

## 2019-04-01 ENCOUNTER — TELEPHONE (OUTPATIENT)
Dept: INTERNAL MEDICINE CLINIC | Facility: CLINIC | Age: 44
End: 2019-04-01

## 2019-04-01 DIAGNOSIS — M25.531 RIGHT WRIST PAIN: Primary | ICD-10-CM

## 2019-04-01 DIAGNOSIS — S69.91XA INJURY OF RIGHT WRIST, INITIAL ENCOUNTER: ICD-10-CM

## 2019-04-01 NOTE — TELEPHONE ENCOUNTER
Spoke with pt she fell on her right hand on Saturday while roller blading.   Her right wrist was swollen and initially she could not  anything with that hand or move the wrist. These symptoms have improved but at times she is unable to move a certain

## 2019-04-01 NOTE — TELEPHONE ENCOUNTER
Patient says she is having R wrist pain, and thinks it might be sprained. She is not able to come in for an appt for a few days, so she wants to speak with a nurse to see what she should do for treatment at home.

## 2019-04-02 ENCOUNTER — HOSPITAL ENCOUNTER (OUTPATIENT)
Dept: GENERAL RADIOLOGY | Age: 44
Discharge: HOME | End: 2019-04-02
Attending: INTERNAL MEDICINE
Payer: COMMERCIAL

## 2019-04-02 DIAGNOSIS — M25.531 RIGHT WRIST PAIN: ICD-10-CM

## 2019-04-02 DIAGNOSIS — S69.91XA INJURY OF RIGHT WRIST, INITIAL ENCOUNTER: ICD-10-CM

## 2019-04-02 PROCEDURE — 73110 X-RAY EXAM OF WRIST: CPT | Performed by: INTERNAL MEDICINE

## 2019-04-11 ENCOUNTER — MED REC SCAN ONLY (OUTPATIENT)
Dept: INTERNAL MEDICINE CLINIC | Facility: CLINIC | Age: 44
End: 2019-04-11

## 2019-06-18 ENCOUNTER — HOSPITAL ENCOUNTER (EMERGENCY)
Facility: HOSPITAL | Age: 44
Discharge: HOME | End: 2019-06-18
Attending: EMERGENCY MEDICINE
Payer: COMMERCIAL

## 2019-06-18 VITALS
SYSTOLIC BLOOD PRESSURE: 127 MMHG | HEIGHT: 64 IN | HEART RATE: 74 BPM | TEMPERATURE: 98 F | DIASTOLIC BLOOD PRESSURE: 70 MMHG | BODY MASS INDEX: 20.14 KG/M2 | OXYGEN SATURATION: 99 % | RESPIRATION RATE: 18 BRPM | WEIGHT: 118 LBS

## 2019-06-18 DIAGNOSIS — S91.312A FOOT LACERATION, LEFT, INITIAL ENCOUNTER: Primary | ICD-10-CM

## 2019-06-18 PROCEDURE — 99283 EMERGENCY DEPT VISIT LOW MDM: CPT

## 2019-06-18 PROCEDURE — 12001 RPR S/N/AX/GEN/TRNK 2.5CM/<: CPT

## 2019-06-18 PROCEDURE — 99282 EMERGENCY DEPT VISIT SF MDM: CPT

## 2019-06-18 NOTE — ED PROVIDER NOTES
Patient Seen in: [facility] Emergency Department    History   Patient presents with:  Laceration Abrasion (integumentary)    Stated Complaint: cut to foot    HPI    17-year-old female presents emergency department who lacerated the back of her left BMI 20.25 kg/m²         Physical Exam    Vital signs reviewed  General appearance: Patient is alert and in no acute distress  HEENT: Pupils equal react to light extraocular muscles intact no scleral icterus, mucous membranes are moist, there is no erythema

## 2019-06-19 ENCOUNTER — TELEPHONE (OUTPATIENT)
Dept: INTERNAL MEDICINE CLINIC | Facility: CLINIC | Age: 44
End: 2019-06-19

## 2019-06-19 NOTE — TELEPHONE ENCOUNTER
Spoke with pt she reports bleeding from ankle laceration that was stitched yesterday has not stopped. Pt was advised to proceed back to the ER for re-assessment. She expressed understanding.

## 2019-06-19 NOTE — TELEPHONE ENCOUNTER
Patient went to ER yesterday to get stiches for a laceration on her ankle, but she says it is still bleeding today and she wants to speak with a nurse to discuss what she should do now.

## 2019-06-20 ENCOUNTER — OFFICE VISIT (OUTPATIENT)
Dept: INTERNAL MEDICINE CLINIC | Facility: CLINIC | Age: 44
End: 2019-06-20

## 2019-06-20 VITALS
DIASTOLIC BLOOD PRESSURE: 58 MMHG | WEIGHT: 120 LBS | HEART RATE: 58 BPM | RESPIRATION RATE: 16 BRPM | TEMPERATURE: 98 F | OXYGEN SATURATION: 98 % | HEIGHT: 64 IN | BODY MASS INDEX: 20.49 KG/M2 | SYSTOLIC BLOOD PRESSURE: 118 MMHG

## 2019-06-20 DIAGNOSIS — E28.2 PCOS (POLYCYSTIC OVARIAN SYNDROME): Primary | ICD-10-CM

## 2019-06-20 DIAGNOSIS — F34.1 DYSTHYMIA: ICD-10-CM

## 2019-06-20 DIAGNOSIS — D22.9 NUMEROUS SKIN MOLES: ICD-10-CM

## 2019-06-20 PROCEDURE — 99213 OFFICE O/P EST LOW 20 MIN: CPT | Performed by: NURSE PRACTITIONER

## 2019-06-20 RX ORDER — ESCITALOPRAM OXALATE 10 MG/1
10 TABLET ORAL
Qty: 90 TABLET | Refills: 3 | Status: SHIPPED | OUTPATIENT
Start: 2019-06-20 | End: 2020-02-25

## 2019-06-20 NOTE — PROGRESS NOTES
HPI:    Patient ID: Nico Kincaid is a 41year old female. No chief complaint on file. Feeling good on medication. PCOS controlled on OCP and anxiety level is improved with lexapro. She denies s/e of medication.  She injured her left posterior ank Drug use: No    Other Topics      Concerns:           Current Outpatient Medications:  escitalopram 10 MG Oral Tab Take 1 tablet (10 mg total) by mouth once daily.  Disp: 90 tablet Rfl: 3   Norethin-Eth Estrad-Fe Biphas (LO LOESTRIN FE) 1 MG-10 MCG / 10 PHYSICAL EXAM:   /58   Pulse 58   Temp 98.2 °F (36.8 °C) (Oral)   Resp 16   Ht 64\"   Wt 120 lb   LMP  (LMP Unknown)   SpO2 98%   BMI 20.60 kg/m²   Physical Exam   Nursing note and vitals reviewed.    Constitutional: She is oriented to person, place,

## 2019-08-26 ENCOUNTER — HOSPITAL ENCOUNTER (OUTPATIENT)
Dept: MAMMOGRAPHY | Age: 44
Discharge: HOME | End: 2019-08-26
Attending: SURGERY
Payer: COMMERCIAL

## 2019-08-26 DIAGNOSIS — R92.0 MICROCALCIFICATION OF LEFT BREAST ON MAMMOGRAM: ICD-10-CM

## 2019-08-26 DIAGNOSIS — D24.1 BREAST FIBROADENOMA IN FEMALE, RIGHT: ICD-10-CM

## 2019-08-26 PROCEDURE — 77062 BREAST TOMOSYNTHESIS BI: CPT | Performed by: SURGERY

## 2019-08-26 PROCEDURE — 77066 DX MAMMO INCL CAD BI: CPT | Performed by: SURGERY

## 2019-08-26 NOTE — PROGRESS NOTES
Please make sure the patient has an appointment for a breast exam.  Okay to state that mammogram looks normal.

## 2019-08-27 NOTE — PROGRESS NOTES
I called patient notified normal mammogram. Offered patient f/u appt unable to make at this time. She will call back to schedule.

## 2019-10-18 ENCOUNTER — OFFICE VISIT (OUTPATIENT)
Dept: INTERNAL MEDICINE CLINIC | Facility: CLINIC | Age: 44
End: 2019-10-18

## 2019-10-18 VITALS
HEIGHT: 64 IN | TEMPERATURE: 98 F | HEART RATE: 64 BPM | DIASTOLIC BLOOD PRESSURE: 64 MMHG | SYSTOLIC BLOOD PRESSURE: 122 MMHG | WEIGHT: 122 LBS | BODY MASS INDEX: 20.83 KG/M2 | RESPIRATION RATE: 16 BRPM

## 2019-10-18 DIAGNOSIS — J01.90 ACUTE BACTERIAL RHINOSINUSITIS: Primary | ICD-10-CM

## 2019-10-18 DIAGNOSIS — B96.89 ACUTE BACTERIAL RHINOSINUSITIS: Primary | ICD-10-CM

## 2019-10-18 PROCEDURE — 90686 IIV4 VACC NO PRSV 0.5 ML IM: CPT | Performed by: NURSE PRACTITIONER

## 2019-10-18 PROCEDURE — 99213 OFFICE O/P EST LOW 20 MIN: CPT | Performed by: NURSE PRACTITIONER

## 2019-10-18 PROCEDURE — 90471 IMMUNIZATION ADMIN: CPT | Performed by: NURSE PRACTITIONER

## 2019-10-18 RX ORDER — PREDNISONE 20 MG/1
40 TABLET ORAL DAILY
Qty: 14 TABLET | Refills: 0 | Status: SHIPPED | OUTPATIENT
Start: 2019-10-18 | End: 2019-11-22

## 2019-10-18 RX ORDER — DOXYCYCLINE HYCLATE 100 MG/1
100 CAPSULE ORAL 2 TIMES DAILY
Qty: 14 CAPSULE | Refills: 0 | Status: SHIPPED | OUTPATIENT
Start: 2019-10-18 | End: 2019-11-22

## 2019-10-18 RX ORDER — CODEINE PHOSPHATE AND GUAIFENESIN 10; 100 MG/5ML; MG/5ML
5 SOLUTION ORAL NIGHTLY PRN
Qty: 120 ML | Refills: 0 | Status: SHIPPED | OUTPATIENT
Start: 2019-10-18 | End: 2020-02-25

## 2019-10-19 NOTE — PROGRESS NOTES
HPI:    Patient ID: Ian Wang is a 41year old female. Patient presents with:  Cough: over the last two weeks   Chest Congestion  Headache      Sinus Problem   This is a new problem. The current episode started 1 to 4 weeks ago (14 days).  The prob Problem Relation Age of Onset   • Diabetes Father    • Other (neuroendocrine tumor) Father    • Hypertension Mother    • Hypertension Brother      Social History    Socioeconomic History      Marital status:       Spouse name: Not on file      Num Results   Component Value Date    WBC 7.2 09/15/2018    RBC 5.05 09/15/2018    HGB 15.0 09/15/2018    HCT 47.0 09/15/2018    MCV 93.1 09/15/2018    MCH 29.7 09/15/2018    MCHC 31.9 09/15/2018    RDW 12.4 09/15/2018    .0 09/15/2018    MPV 10.0 12/17 and time. Skin: Skin is warm and dry. Psychiatric: She has a normal mood and affect. ASSESSMENT/PLAN:   Diagnoses and all orders for this visit:    Acute bacterial rhinosinusitis  -     Doxycycline Hyclate 100 MG Oral Cap;  Take 1 capsule (10

## 2019-10-26 DIAGNOSIS — E28.2 PCOS (POLYCYSTIC OVARIAN SYNDROME): ICD-10-CM

## 2019-10-28 RX ORDER — NORETHINDRONE ACETATE AND ETHINYL ESTRADIOL, ETHINYL ESTRADIOL AND FERROUS FUMARATE 1MG-10(24)
KIT ORAL
Qty: 84 TABLET | Refills: 1 | Status: SHIPPED | OUTPATIENT
Start: 2019-10-28 | End: 2020-04-01

## 2020-01-24 DIAGNOSIS — R92.0 MAMMOGRAPHIC MICROCALCIFICATION FOUND ON DIAGNOSTIC IMAGING OF BREAST: Primary | ICD-10-CM

## 2020-02-25 ENCOUNTER — OFFICE VISIT (OUTPATIENT)
Dept: INTERNAL MEDICINE CLINIC | Facility: CLINIC | Age: 45
End: 2020-02-25

## 2020-02-25 VITALS
WEIGHT: 121 LBS | RESPIRATION RATE: 16 BRPM | DIASTOLIC BLOOD PRESSURE: 74 MMHG | BODY MASS INDEX: 20.66 KG/M2 | TEMPERATURE: 98 F | HEIGHT: 64 IN | HEART RATE: 78 BPM | SYSTOLIC BLOOD PRESSURE: 116 MMHG | OXYGEN SATURATION: 97 %

## 2020-02-25 DIAGNOSIS — M25.561 ARTHRALGIA OF BOTH KNEES: ICD-10-CM

## 2020-02-25 DIAGNOSIS — Z13.21 SCREENING FOR ENDOCRINE, NUTRITIONAL, METABOLIC AND IMMUNITY DISORDER: ICD-10-CM

## 2020-02-25 DIAGNOSIS — Z13.29 SCREENING FOR ENDOCRINE, NUTRITIONAL, METABOLIC AND IMMUNITY DISORDER: ICD-10-CM

## 2020-02-25 DIAGNOSIS — Z00.00 ANNUAL PHYSICAL EXAM: Primary | ICD-10-CM

## 2020-02-25 DIAGNOSIS — Z13.228 SCREENING FOR ENDOCRINE, NUTRITIONAL, METABOLIC AND IMMUNITY DISORDER: ICD-10-CM

## 2020-02-25 DIAGNOSIS — Z13.89 SCREENING FOR GENITOURINARY CONDITION: ICD-10-CM

## 2020-02-25 DIAGNOSIS — F34.1 DYSTHYMIA: ICD-10-CM

## 2020-02-25 DIAGNOSIS — Z13.1 SCREENING FOR DIABETES MELLITUS: ICD-10-CM

## 2020-02-25 DIAGNOSIS — M25.562 ARTHRALGIA OF BOTH KNEES: ICD-10-CM

## 2020-02-25 DIAGNOSIS — Z13.0 SCREENING FOR ENDOCRINE, NUTRITIONAL, METABOLIC AND IMMUNITY DISORDER: ICD-10-CM

## 2020-02-25 DIAGNOSIS — Z13.220 SCREENING FOR LIPID DISORDERS: ICD-10-CM

## 2020-02-25 PROCEDURE — 99396 PREV VISIT EST AGE 40-64: CPT | Performed by: NURSE PRACTITIONER

## 2020-02-25 RX ORDER — ESCITALOPRAM OXALATE 10 MG/1
10 TABLET ORAL
Qty: 90 TABLET | Refills: 3 | Status: SHIPPED | OUTPATIENT
Start: 2020-02-25 | End: 2021-04-29

## 2020-02-25 NOTE — PATIENT INSTRUCTIONS
[facility] Nurse Practitioner [phone]  Mammogram due now [address] [place] [address] Ages 36 to 52  Screening tests and vaccines are an important part of managing your health.  A screening test is done to fin Multiple tests are available and are used at different times.  Possible tests include:  · Flexible sigmoidoscopy every 5 years, or  · Colonoscopy every 10 years, or  · CT colonography (virtual colonoscopy) every 5 years, or  · Yearly fecal occult blood test group who have no record of this infection or vaccine 2 doses; the second dose should be given at least 4 weeks after the first dose   Hepatitis A Women at increased risk for infection–talk with your healthcare provider 2 doses given 6 months apart   Hepat Association  2 American College of Obstetricians and Gynecologists   3 American Cancer Society  [address] [facility]  Date Last Reviewed: 11/1/2017  © 3480-6503 [employer] [employer]. [address] [facility], [address] [place] [address].  All rights

## 2020-02-25 NOTE — PROGRESS NOTES
Wellness Exam    CC: Patient is presenting for a wellness exam    HPI:   Concerns: c/o bilateral knee pain, right hip pain and intermittent lumbar pain. She reports pain is brutal at night going up and down stairs.  She does not have exercise or stretching 1 TABLET BY MOUTH EVERY DAY, Disp: 84 tablet, Rfl: 1  [DISCONTINUED] escitalopram 10 MG Oral Tab, Take 1 tablet (10 mg total) by mouth once daily. , Disp: 90 tablet, Rfl: 3    No current facility-administered medications on file prior to visit.        Review normal. There is no tenderness. No HSM. Musculoskeletal: Normal range of motion. She exhibits no edema. Lymphadenopathy: She has no cervical, supraclavicular, or axillary adenopathy.    : deferred to gyne  Neurological: She is alert and oriented to per

## 2020-02-29 ENCOUNTER — LAB ENCOUNTER (OUTPATIENT)
Dept: LAB | Age: 45
End: 2020-02-29
Attending: NURSE PRACTITIONER
Payer: COMMERCIAL

## 2020-02-29 DIAGNOSIS — Z13.89 SCREENING FOR GENITOURINARY CONDITION: ICD-10-CM

## 2020-02-29 DIAGNOSIS — M25.562 ARTHRALGIA OF BOTH KNEES: ICD-10-CM

## 2020-02-29 DIAGNOSIS — Z13.0 SCREENING FOR ENDOCRINE, NUTRITIONAL, METABOLIC AND IMMUNITY DISORDER: ICD-10-CM

## 2020-02-29 DIAGNOSIS — M25.561 ARTHRALGIA OF BOTH KNEES: ICD-10-CM

## 2020-02-29 DIAGNOSIS — Z13.220 SCREENING FOR LIPID DISORDERS: ICD-10-CM

## 2020-02-29 DIAGNOSIS — Z13.228 SCREENING FOR ENDOCRINE, NUTRITIONAL, METABOLIC AND IMMUNITY DISORDER: ICD-10-CM

## 2020-02-29 DIAGNOSIS — Z13.1 SCREENING FOR DIABETES MELLITUS: ICD-10-CM

## 2020-02-29 DIAGNOSIS — Z13.29 SCREENING FOR ENDOCRINE, NUTRITIONAL, METABOLIC AND IMMUNITY DISORDER: ICD-10-CM

## 2020-02-29 DIAGNOSIS — Z13.21 SCREENING FOR ENDOCRINE, NUTRITIONAL, METABOLIC AND IMMUNITY DISORDER: ICD-10-CM

## 2020-02-29 LAB
ALBUMIN SERPL-MCNC: 3.5 G/DL (ref 3.4–5)
ALBUMIN/GLOB SERPL: 0.9 {RATIO} (ref 1–2)
ALP LIVER SERPL-CCNC: 62 U/L (ref 37–98)
ALT SERPL-CCNC: 20 U/L (ref 13–56)
ANION GAP SERPL CALC-SCNC: 3 MMOL/L (ref 0–18)
AST SERPL-CCNC: 13 U/L (ref 15–37)
BASOPHILS # BLD AUTO: 0.03 X10(3) UL (ref 0–0.2)
BASOPHILS NFR BLD AUTO: 0.5 %
BILIRUB SERPL-MCNC: 0.5 MG/DL (ref 0.1–2)
BILIRUB UR QL STRIP.AUTO: NEGATIVE
BUN BLD-MCNC: 9 MG/DL (ref 7–18)
BUN/CREAT SERPL: 10.1 (ref 10–20)
CALCIUM BLD-MCNC: 8.9 MG/DL (ref 8.5–10.1)
CHLORIDE SERPL-SCNC: 109 MMOL/L (ref 98–112)
CHOLEST SMN-MCNC: 163 MG/DL (ref ?–200)
CO2 SERPL-SCNC: 27 MMOL/L (ref 21–32)
COLOR UR AUTO: YELLOW
CREAT BLD-MCNC: 0.89 MG/DL (ref 0.55–1.02)
CRP SERPL-MCNC: <0.29 MG/DL (ref ?–0.3)
DEPRECATED RDW RBC AUTO: 44.4 FL (ref 35.1–46.3)
EOSINOPHIL # BLD AUTO: 0.1 X10(3) UL (ref 0–0.7)
EOSINOPHIL NFR BLD AUTO: 1.7 %
ERYTHROCYTE [DISTWIDTH] IN BLOOD BY AUTOMATED COUNT: 13.1 % (ref 11–15)
EST. AVERAGE GLUCOSE BLD GHB EST-MCNC: 100 MG/DL (ref 68–126)
GLOBULIN PLAS-MCNC: 3.8 G/DL (ref 2.8–4.4)
GLUCOSE BLD-MCNC: 84 MG/DL (ref 70–99)
GLUCOSE UR STRIP.AUTO-MCNC: NEGATIVE MG/DL
HBA1C MFR BLD HPLC: 5.1 % (ref ?–5.7)
HCT VFR BLD AUTO: 46.7 % (ref 35–48)
HDLC SERPL-MCNC: 60 MG/DL (ref 40–59)
HGB BLD-MCNC: 15 G/DL (ref 12–16)
IMM GRANULOCYTES # BLD AUTO: 0.01 X10(3) UL (ref 0–1)
IMM GRANULOCYTES NFR BLD: 0.2 %
KETONES UR STRIP.AUTO-MCNC: NEGATIVE MG/DL
LDLC SERPL CALC-MCNC: 83 MG/DL (ref ?–100)
LEUKOCYTE ESTERASE UR QL STRIP.AUTO: NEGATIVE
LYMPHOCYTES # BLD AUTO: 1.66 X10(3) UL (ref 1–4)
LYMPHOCYTES NFR BLD AUTO: 27.9 %
M PROTEIN MFR SERPL ELPH: 7.3 G/DL (ref 6.4–8.2)
MCH RBC QN AUTO: 30 PG (ref 26–34)
MCHC RBC AUTO-ENTMCNC: 32.1 G/DL (ref 31–37)
MCV RBC AUTO: 93.4 FL (ref 80–100)
MONOCYTES # BLD AUTO: 0.33 X10(3) UL (ref 0.1–1)
MONOCYTES NFR BLD AUTO: 5.5 %
NEUTROPHILS # BLD AUTO: 3.82 X10 (3) UL (ref 1.5–7.7)
NEUTROPHILS # BLD AUTO: 3.82 X10(3) UL (ref 1.5–7.7)
NEUTROPHILS NFR BLD AUTO: 64.2 %
NITRITE UR QL STRIP.AUTO: NEGATIVE
NONHDLC SERPL-MCNC: 103 MG/DL (ref ?–130)
OSMOLALITY SERPL CALC.SUM OF ELEC: 286 MOSM/KG (ref 275–295)
PATIENT FASTING Y/N/NP: YES
PATIENT FASTING Y/N/NP: YES
PH UR STRIP.AUTO: 8 [PH] (ref 4.5–8)
PLATELET # BLD AUTO: 228 10(3)UL (ref 150–450)
POTASSIUM SERPL-SCNC: 4.1 MMOL/L (ref 3.5–5.1)
PROT UR STRIP.AUTO-MCNC: NEGATIVE MG/DL
RBC # BLD AUTO: 5 X10(6)UL (ref 3.8–5.3)
RHEUMATOID FACT SERPL-ACNC: <10 IU/ML (ref ?–15)
SED RATE-ML: 5 MM/HR (ref 0–25)
SODIUM SERPL-SCNC: 139 MMOL/L (ref 136–145)
SP GR UR STRIP.AUTO: 1.02 (ref 1–1.03)
TRIGL SERPL-MCNC: 98 MG/DL (ref 30–149)
TSI SER-ACNC: 1.3 MIU/ML (ref 0.36–3.74)
URATE SERPL-MCNC: 2.5 MG/DL (ref 2.6–6)
UROBILINOGEN UR STRIP.AUTO-MCNC: <2 MG/DL
VIT D+METAB SERPL-MCNC: 21.1 NG/ML (ref 30–100)
VLDLC SERPL CALC-MCNC: 20 MG/DL (ref 0–30)
WBC # BLD AUTO: 6 X10(3) UL (ref 4–11)

## 2020-02-29 PROCEDURE — 36415 COLL VENOUS BLD VENIPUNCTURE: CPT

## 2020-02-29 PROCEDURE — 83036 HEMOGLOBIN GLYCOSYLATED A1C: CPT

## 2020-02-29 PROCEDURE — 80061 LIPID PANEL: CPT

## 2020-02-29 PROCEDURE — 84443 ASSAY THYROID STIM HORMONE: CPT

## 2020-02-29 PROCEDURE — 85652 RBC SED RATE AUTOMATED: CPT

## 2020-02-29 PROCEDURE — 81001 URINALYSIS AUTO W/SCOPE: CPT

## 2020-02-29 PROCEDURE — 84550 ASSAY OF BLOOD/URIC ACID: CPT

## 2020-02-29 PROCEDURE — 86038 ANTINUCLEAR ANTIBODIES: CPT

## 2020-02-29 PROCEDURE — 80053 COMPREHEN METABOLIC PANEL: CPT

## 2020-02-29 PROCEDURE — 86140 C-REACTIVE PROTEIN: CPT

## 2020-02-29 PROCEDURE — 82306 VITAMIN D 25 HYDROXY: CPT

## 2020-02-29 PROCEDURE — 85025 COMPLETE CBC W/AUTO DIFF WBC: CPT

## 2020-02-29 PROCEDURE — 86431 RHEUMATOID FACTOR QUANT: CPT

## 2020-02-29 PROCEDURE — 86200 CCP ANTIBODY: CPT

## 2020-03-02 ENCOUNTER — TELEPHONE (OUTPATIENT)
Dept: INTERNAL MEDICINE CLINIC | Facility: CLINIC | Age: 45
End: 2020-03-02

## 2020-03-02 DIAGNOSIS — E55.9 VITAMIN D DEFICIENCY: Primary | ICD-10-CM

## 2020-03-02 LAB — CCP IGG SERPL-ACNC: 0.7 U/ML (ref 0–6.9)

## 2020-03-02 RX ORDER — ERGOCALCIFEROL 1.25 MG/1
50000 CAPSULE ORAL WEEKLY
Qty: 12 CAPSULE | Refills: 0 | Status: SHIPPED | OUTPATIENT
Start: 2020-03-02 | End: 2021-05-24 | Stop reason: ALTCHOICE

## 2020-03-02 NOTE — TELEPHONE ENCOUNTER
Results and recommnedations d/w pt she expressed understadning. She is aware we are awaiting final results on MICHELLE and CCP. Rx sent to retail and lab order placed.

## 2020-03-02 NOTE — TELEPHONE ENCOUNTER
----- Message from PEARL Arce sent at 3/2/2020  9:16 AM CST -----  Results reviewed. Please inform patient no DM, CBC, TSH, lipid, Kidney, liver, and electrolytes WNL. not clean catch urine specimen, no need to repeat. Autoimmune panel negative.

## 2020-03-04 LAB — ANA SCREEN: NEGATIVE

## 2020-04-01 DIAGNOSIS — E28.2 PCOS (POLYCYSTIC OVARIAN SYNDROME): ICD-10-CM

## 2020-04-01 RX ORDER — NORETHINDRONE ACETATE AND ETHINYL ESTRADIOL, ETHINYL ESTRADIOL AND FERROUS FUMARATE 1MG-10(24)
KIT ORAL
Qty: 84 TABLET | Refills: 1 | Status: SHIPPED | OUTPATIENT
Start: 2020-04-01 | End: 2020-09-15

## 2020-07-30 ENCOUNTER — HOSPITAL ENCOUNTER (OUTPATIENT)
Dept: MAMMOGRAPHY | Facility: HOSPITAL | Age: 45
Discharge: HOME | End: 2020-07-30
Attending: SURGERY
Payer: COMMERCIAL

## 2020-07-30 DIAGNOSIS — R92.0 MAMMOGRAPHIC MICROCALCIFICATION FOUND ON DIAGNOSTIC IMAGING OF BREAST: ICD-10-CM

## 2020-07-30 PROCEDURE — 77062 BREAST TOMOSYNTHESIS BI: CPT | Performed by: SURGERY

## 2020-07-30 PROCEDURE — 77066 DX MAMMO INCL CAD BI: CPT | Performed by: SURGERY

## 2020-07-31 ENCOUNTER — TELEPHONE (OUTPATIENT)
Dept: SURGERY | Facility: CLINIC | Age: 45
End: 2020-07-31

## 2020-07-31 NOTE — TELEPHONE ENCOUNTER
I contacted PT. Regarding the note that Dr. Tiffani Corral sent us regarding recent giovana and exam. PT.  Was informed that her giovana came back normal and we would need to bring her back in the office for a breast exam. Pt. V/U and was away from her calendar at this dipika

## 2020-09-14 DIAGNOSIS — E28.2 PCOS (POLYCYSTIC OVARIAN SYNDROME): ICD-10-CM

## 2020-09-15 RX ORDER — NORETHINDRONE ACETATE AND ETHINYL ESTRADIOL, ETHINYL ESTRADIOL AND FERROUS FUMARATE 1MG-10(24)
KIT ORAL
Qty: 84 TABLET | Refills: 1 | Status: SHIPPED | OUTPATIENT
Start: 2020-09-15 | End: 2021-03-08

## 2021-03-08 DIAGNOSIS — E28.2 PCOS (POLYCYSTIC OVARIAN SYNDROME): ICD-10-CM

## 2021-03-08 RX ORDER — NORETHINDRONE ACETATE AND ETHINYL ESTRADIOL, ETHINYL ESTRADIOL AND FERROUS FUMARATE 1MG-10(24)
KIT ORAL
Qty: 84 TABLET | Refills: 0 | Status: SHIPPED | OUTPATIENT
Start: 2021-03-08 | End: 2021-04-29

## 2021-04-29 DIAGNOSIS — E28.2 PCOS (POLYCYSTIC OVARIAN SYNDROME): ICD-10-CM

## 2021-04-29 DIAGNOSIS — F34.1 DYSTHYMIA: ICD-10-CM

## 2021-04-29 RX ORDER — NORETHINDRONE ACETATE AND ETHINYL ESTRADIOL, ETHINYL ESTRADIOL AND FERROUS FUMARATE 1MG-10(24)
KIT ORAL
Qty: 84 TABLET | Refills: 0 | Status: SHIPPED | OUTPATIENT
Start: 2021-04-29 | End: 2021-08-22

## 2021-04-29 RX ORDER — ESCITALOPRAM OXALATE 10 MG/1
10 TABLET ORAL DAILY
Qty: 90 TABLET | Refills: 0 | Status: SHIPPED | OUTPATIENT
Start: 2021-04-29 | End: 2021-07-22

## 2021-04-29 NOTE — TELEPHONE ENCOUNTER
Last time medication was refilled 2/25/2020  Quantity and number of refills 90 w/ 3  Last OV 2/25/2020  Next OV 2/21     Last time medication was refilled 3/8/21  Quantity and number of refills 30 w/ 0  Last OV 2/25/2020  Next OV 2/21

## 2021-05-07 ENCOUNTER — PATIENT OUTREACH (OUTPATIENT)
Dept: INTERNAL MEDICINE CLINIC | Facility: CLINIC | Age: 46
End: 2021-05-07

## 2021-05-07 DIAGNOSIS — Z00.00 LABORATORY EXAMINATION ORDERED AS PART OF A ROUTINE GENERAL MEDICAL EXAMINATION: Primary | ICD-10-CM

## 2021-05-15 ENCOUNTER — LAB ENCOUNTER (OUTPATIENT)
Dept: LAB | Age: 46
End: 2021-05-15
Attending: INTERNAL MEDICINE
Payer: COMMERCIAL

## 2021-05-15 DIAGNOSIS — Z00.00 LABORATORY EXAMINATION ORDERED AS PART OF A ROUTINE GENERAL MEDICAL EXAMINATION: ICD-10-CM

## 2021-05-15 PROCEDURE — 83036 HEMOGLOBIN GLYCOSYLATED A1C: CPT

## 2021-05-15 PROCEDURE — 80061 LIPID PANEL: CPT

## 2021-05-15 PROCEDURE — 81001 URINALYSIS AUTO W/SCOPE: CPT

## 2021-05-15 PROCEDURE — 36415 COLL VENOUS BLD VENIPUNCTURE: CPT

## 2021-05-15 PROCEDURE — 80053 COMPREHEN METABOLIC PANEL: CPT

## 2021-05-15 PROCEDURE — 84443 ASSAY THYROID STIM HORMONE: CPT

## 2021-05-15 PROCEDURE — 85025 COMPLETE CBC W/AUTO DIFF WBC: CPT

## 2021-05-24 ENCOUNTER — OFFICE VISIT (OUTPATIENT)
Dept: INTERNAL MEDICINE CLINIC | Facility: CLINIC | Age: 46
End: 2021-05-24

## 2021-05-24 VITALS
DIASTOLIC BLOOD PRESSURE: 72 MMHG | TEMPERATURE: 98 F | RESPIRATION RATE: 16 BRPM | HEART RATE: 80 BPM | OXYGEN SATURATION: 99 % | BODY MASS INDEX: 22.53 KG/M2 | WEIGHT: 132 LBS | HEIGHT: 64 IN | SYSTOLIC BLOOD PRESSURE: 114 MMHG

## 2021-05-24 DIAGNOSIS — N20.0 NEPHROLITHIASIS: ICD-10-CM

## 2021-05-24 DIAGNOSIS — Z00.00 ROUTINE PHYSICAL EXAMINATION: Primary | ICD-10-CM

## 2021-05-24 DIAGNOSIS — Z12.31 ENCOUNTER FOR SCREENING MAMMOGRAM FOR MALIGNANT NEOPLASM OF BREAST: ICD-10-CM

## 2021-05-24 DIAGNOSIS — Z01.419 ENCOUNTER FOR GYNECOLOGICAL EXAMINATION WITHOUT ABNORMAL FINDING: ICD-10-CM

## 2021-05-24 DIAGNOSIS — Z12.4 SCREENING FOR MALIGNANT NEOPLASM OF CERVIX: ICD-10-CM

## 2021-05-24 DIAGNOSIS — K90.0 CELIAC DISEASE: ICD-10-CM

## 2021-05-24 PROCEDURE — 99396 PREV VISIT EST AGE 40-64: CPT | Performed by: PHYSICIAN ASSISTANT

## 2021-05-24 PROCEDURE — 3078F DIAST BP <80 MM HG: CPT | Performed by: PHYSICIAN ASSISTANT

## 2021-05-24 PROCEDURE — 87624 HPV HI-RISK TYP POOLED RSLT: CPT | Performed by: PHYSICIAN ASSISTANT

## 2021-05-24 PROCEDURE — 3008F BODY MASS INDEX DOCD: CPT | Performed by: PHYSICIAN ASSISTANT

## 2021-05-24 PROCEDURE — 88175 CYTOPATH C/V AUTO FLUID REDO: CPT | Performed by: PHYSICIAN ASSISTANT

## 2021-05-24 PROCEDURE — 3074F SYST BP LT 130 MM HG: CPT | Performed by: PHYSICIAN ASSISTANT

## 2021-05-24 NOTE — PROGRESS NOTES
Wellness Exam    CC: Patient is presenting for a wellness exam    HPI:   Concerns: doing well   Lab review: + microscopic hematuria. +PMH stones, feels she may have one currently, has had intermittent left flank discomfort.  States she has passed stones a f tablet, Rfl: 0  escitalopram 10 MG Oral Tab, Take 1 tablet (10 mg total) by mouth daily. Due for an appointment. Call office to schedule., Disp: 90 tablet, Rfl: 0    No current facility-administered medications on file prior to visit.       Review of System heard.  Pulmonary/Chest: Effort normal and breath sounds normal bilaterally. She has no wheezes or rales. Breasts: Appearance symmetrical without dimpling or discharge. No masses appreciated b/l. Abdominal: Soft.  Bowel sounds are normal. There is no Medical education done. Outcome: Patient verbalizes understanding.        Educated by: Olga Rodriguez

## 2021-05-24 NOTE — PATIENT INSTRUCTIONS
Schedule ultrasound of kidneys/bladder  Continue healthy lifestyle  Complete TTG blood test to clarify celiac diagnosis

## 2021-07-22 DIAGNOSIS — F34.1 DYSTHYMIA: ICD-10-CM

## 2021-07-22 RX ORDER — ESCITALOPRAM OXALATE 10 MG/1
10 TABLET ORAL DAILY
Qty: 90 TABLET | Refills: 0 | OUTPATIENT
Start: 2021-07-22

## 2021-07-22 RX ORDER — ESCITALOPRAM OXALATE 10 MG/1
10 TABLET ORAL DAILY
Qty: 90 TABLET | Refills: 0 | Status: SHIPPED | OUTPATIENT
Start: 2021-07-22 | End: 2021-11-19

## 2021-08-22 DIAGNOSIS — E28.2 PCOS (POLYCYSTIC OVARIAN SYNDROME): ICD-10-CM

## 2021-08-22 RX ORDER — NORETHINDRONE ACETATE AND ETHINYL ESTRADIOL, ETHINYL ESTRADIOL AND FERROUS FUMARATE 1MG-10(24)
KIT ORAL
Qty: 84 TABLET | Refills: 0 | Status: SHIPPED | OUTPATIENT
Start: 2021-08-22 | End: 2021-11-14

## 2021-11-13 DIAGNOSIS — E28.2 PCOS (POLYCYSTIC OVARIAN SYNDROME): ICD-10-CM

## 2021-11-14 RX ORDER — NORETHINDRONE ACETATE AND ETHINYL ESTRADIOL, ETHINYL ESTRADIOL AND FERROUS FUMARATE 1MG-10(24)
KIT ORAL
Qty: 84 TABLET | Refills: 0 | Status: SHIPPED | OUTPATIENT
Start: 2021-11-14 | End: 2022-02-03

## 2021-11-19 DIAGNOSIS — F34.1 DYSTHYMIA: ICD-10-CM

## 2021-11-19 RX ORDER — ESCITALOPRAM OXALATE 10 MG/1
10 TABLET ORAL DAILY
Qty: 90 TABLET | Refills: 0 | Status: SHIPPED | OUTPATIENT
Start: 2021-11-19 | End: 2022-02-01

## 2021-11-19 NOTE — TELEPHONE ENCOUNTER
Last time medication was refilled 7/22/21  Quantity and # of refills 90 tab, no refill  Last OV 5/24/21  Next OV Due for annual 5/24/22

## 2022-02-01 RX ORDER — ESCITALOPRAM OXALATE 10 MG/1
10 TABLET ORAL DAILY
Qty: 90 TABLET | Refills: 0 | Status: SHIPPED | OUTPATIENT
Start: 2022-02-01

## 2022-02-01 NOTE — TELEPHONE ENCOUNTER
Last time medication was refilled 11/19/21  Quantity and # of refills 90 tab no refill  Last OV 5/24/21  Next OV no appt, Due 5/22

## 2022-02-03 RX ORDER — NORETHINDRONE ACETATE AND ETHINYL ESTRADIOL, ETHINYL ESTRADIOL AND FERROUS FUMARATE 1MG-10(24)
KIT ORAL
Qty: 84 TABLET | Refills: 0 | Status: SHIPPED | OUTPATIENT
Start: 2022-02-03

## 2022-02-03 NOTE — TELEPHONE ENCOUNTER
Last time medication was refilled 11/14/21  Quantity and number of refills 84 w/0  Last OV 5/4/21  Next OV 5/2022

## 2022-04-07 ENCOUNTER — TELEPHONE (OUTPATIENT)
Dept: INTERNAL MEDICINE CLINIC | Facility: CLINIC | Age: 47
End: 2022-04-07

## 2022-04-07 NOTE — TELEPHONE ENCOUNTER
Per patient, symptom onset roughly two weeks prior. Productive cough with tan phlegm, head ache and congestion. 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 Patient has been using otc mucinex and tylenol with no relief. OV scheduled tomorrow am with Eric Corona for further eval. Patient verbalized understanding.

## 2022-04-07 NOTE — TELEPHONE ENCOUNTER
Pt states she has a productive cough, took a covid test 2 wks ago was negative,  Want to come in to see pavan tomorrow.   Ok to schedule

## 2022-04-08 ENCOUNTER — OFFICE VISIT (OUTPATIENT)
Dept: INTERNAL MEDICINE CLINIC | Facility: CLINIC | Age: 47
End: 2022-04-08
Payer: COMMERCIAL

## 2022-04-08 ENCOUNTER — TELEPHONE (OUTPATIENT)
Dept: INTERNAL MEDICINE CLINIC | Facility: CLINIC | Age: 47
End: 2022-04-08

## 2022-04-08 VITALS
BODY MASS INDEX: 22.71 KG/M2 | TEMPERATURE: 98 F | HEIGHT: 64 IN | DIASTOLIC BLOOD PRESSURE: 68 MMHG | RESPIRATION RATE: 14 BRPM | OXYGEN SATURATION: 97 % | HEART RATE: 67 BPM | SYSTOLIC BLOOD PRESSURE: 112 MMHG | WEIGHT: 133 LBS

## 2022-04-08 DIAGNOSIS — R05.9 COUGH: ICD-10-CM

## 2022-04-08 DIAGNOSIS — B96.89 ACUTE BACTERIAL RHINOSINUSITIS: Primary | ICD-10-CM

## 2022-04-08 DIAGNOSIS — J01.90 ACUTE BACTERIAL RHINOSINUSITIS: Primary | ICD-10-CM

## 2022-04-08 PROCEDURE — 3078F DIAST BP <80 MM HG: CPT | Performed by: NURSE PRACTITIONER

## 2022-04-08 PROCEDURE — 3008F BODY MASS INDEX DOCD: CPT | Performed by: NURSE PRACTITIONER

## 2022-04-08 PROCEDURE — 3074F SYST BP LT 130 MM HG: CPT | Performed by: NURSE PRACTITIONER

## 2022-04-08 PROCEDURE — 99213 OFFICE O/P EST LOW 20 MIN: CPT | Performed by: NURSE PRACTITIONER

## 2022-04-08 RX ORDER — DOXYCYCLINE HYCLATE 100 MG/1
100 CAPSULE ORAL 2 TIMES DAILY
Qty: 14 CAPSULE | Refills: 0 | Status: SHIPPED | OUTPATIENT
Start: 2022-04-08

## 2022-04-08 RX ORDER — PREDNISONE 20 MG/1
40 TABLET ORAL DAILY
Qty: 14 TABLET | Refills: 0 | Status: SHIPPED | OUTPATIENT
Start: 2022-04-08 | End: 2022-04-15

## 2022-04-08 RX ORDER — CODEINE PHOSPHATE AND GUAIFENESIN 10; 100 MG/5ML; MG/5ML
5 SOLUTION ORAL NIGHTLY PRN
Qty: 120 ML | Refills: 0 | Status: SHIPPED | OUTPATIENT
Start: 2022-04-08

## 2022-04-08 NOTE — TELEPHONE ENCOUNTER
Pt wanted to find out is she can take cough meds during the day for cough  Advised to be carefull of side effects of drowsiness  Pt v/u

## 2022-04-26 RX ORDER — NORETHINDRONE ACETATE AND ETHINYL ESTRADIOL, ETHINYL ESTRADIOL AND FERROUS FUMARATE 1MG-10(24)
KIT ORAL
Qty: 84 TABLET | Refills: 0 | Status: SHIPPED | OUTPATIENT
Start: 2022-04-26

## 2022-04-26 NOTE — TELEPHONE ENCOUNTER
Last time medication was refilled 2/3/22   Quantity and # of refills 84 tabs w/0 refills   Last OV  4/8/22   Next OV No future appointments.

## 2022-07-25 DIAGNOSIS — E28.2 PCOS (POLYCYSTIC OVARIAN SYNDROME): ICD-10-CM

## 2022-07-25 RX ORDER — NORETHINDRONE ACETATE AND ETHINYL ESTRADIOL, ETHINYL ESTRADIOL AND FERROUS FUMARATE 1MG-10(24)
KIT ORAL
Qty: 84 TABLET | Refills: 0 | Status: SHIPPED | OUTPATIENT
Start: 2022-07-25

## 2022-07-25 NOTE — TELEPHONE ENCOUNTER
Last time medication was refilled 4/26/22  Quantity and # of refills 84 tabs w/0 refills  Last OV 4/8/22  Next OV No future appointments. Per protocol to provider.

## 2023-04-12 ENCOUNTER — OFFICE VISIT (OUTPATIENT)
Dept: INTERNAL MEDICINE CLINIC | Facility: CLINIC | Age: 48
End: 2023-04-12
Payer: COMMERCIAL

## 2023-04-12 VITALS
RESPIRATION RATE: 16 BRPM | WEIGHT: 104 LBS | BODY MASS INDEX: 17.75 KG/M2 | HEART RATE: 72 BPM | TEMPERATURE: 98 F | DIASTOLIC BLOOD PRESSURE: 72 MMHG | OXYGEN SATURATION: 98 % | HEIGHT: 64 IN | SYSTOLIC BLOOD PRESSURE: 118 MMHG

## 2023-04-12 DIAGNOSIS — H18.891 CORNEAL IRRITATION OF RIGHT EYE: Primary | ICD-10-CM

## 2023-04-12 DIAGNOSIS — Z12.31 ENCOUNTER FOR SCREENING MAMMOGRAM FOR MALIGNANT NEOPLASM OF BREAST: ICD-10-CM

## 2023-04-12 PROCEDURE — 3074F SYST BP LT 130 MM HG: CPT | Performed by: NURSE PRACTITIONER

## 2023-04-12 PROCEDURE — 3008F BODY MASS INDEX DOCD: CPT | Performed by: NURSE PRACTITIONER

## 2023-04-12 PROCEDURE — 99213 OFFICE O/P EST LOW 20 MIN: CPT | Performed by: NURSE PRACTITIONER

## 2023-04-12 PROCEDURE — 3078F DIAST BP <80 MM HG: CPT | Performed by: NURSE PRACTITIONER

## 2023-10-13 ENCOUNTER — OFFICE VISIT (OUTPATIENT)
Dept: INTERNAL MEDICINE CLINIC | Facility: CLINIC | Age: 48
End: 2023-10-13
Payer: COMMERCIAL

## 2023-10-13 ENCOUNTER — HOSPITAL ENCOUNTER (OUTPATIENT)
Dept: MAMMOGRAPHY | Age: 48
Discharge: HOME | End: 2023-10-13
Attending: NURSE PRACTITIONER
Payer: COMMERCIAL

## 2023-10-13 ENCOUNTER — LAB ENCOUNTER (OUTPATIENT)
Dept: LAB | Age: 48
End: 2023-10-13
Attending: NURSE PRACTITIONER
Payer: COMMERCIAL

## 2023-10-13 VITALS
RESPIRATION RATE: 14 BRPM | HEIGHT: 64 IN | WEIGHT: 102 LBS | OXYGEN SATURATION: 100 % | HEART RATE: 61 BPM | SYSTOLIC BLOOD PRESSURE: 92 MMHG | TEMPERATURE: 98 F | BODY MASS INDEX: 17.42 KG/M2 | DIASTOLIC BLOOD PRESSURE: 60 MMHG

## 2023-10-13 DIAGNOSIS — M25.511 CHRONIC RIGHT SHOULDER PAIN: ICD-10-CM

## 2023-10-13 DIAGNOSIS — Z00.00 LABORATORY EXAMINATION ORDERED AS PART OF A ROUTINE GENERAL MEDICAL EXAMINATION: ICD-10-CM

## 2023-10-13 DIAGNOSIS — Z12.31 ENCOUNTER FOR SCREENING MAMMOGRAM FOR MALIGNANT NEOPLASM OF BREAST: ICD-10-CM

## 2023-10-13 DIAGNOSIS — G89.29 CHRONIC RIGHT SHOULDER PAIN: ICD-10-CM

## 2023-10-13 DIAGNOSIS — Z00.00 ANNUAL PHYSICAL EXAM: Primary | ICD-10-CM

## 2023-10-13 LAB
ALBUMIN SERPL-MCNC: 3.7 G/DL (ref 3.4–5)
ALBUMIN/GLOB SERPL: 1.1 {RATIO} (ref 1–2)
ALP LIVER SERPL-CCNC: 69 U/L
ALT SERPL-CCNC: 37 U/L
ANION GAP SERPL CALC-SCNC: 3 MMOL/L (ref 0–18)
AST SERPL-CCNC: 21 U/L (ref 15–37)
BASOPHILS # BLD AUTO: 0.03 X10(3) UL (ref 0–0.2)
BASOPHILS NFR BLD AUTO: 0.5 %
BILIRUB SERPL-MCNC: 0.6 MG/DL (ref 0.1–2)
BILIRUB UR QL STRIP.AUTO: NEGATIVE
BUN BLD-MCNC: 11 MG/DL (ref 7–18)
CALCIUM BLD-MCNC: 9.2 MG/DL (ref 8.5–10.1)
CHLORIDE SERPL-SCNC: 106 MMOL/L (ref 98–112)
CHOLEST SERPL-MCNC: 177 MG/DL (ref ?–200)
CO2 SERPL-SCNC: 28 MMOL/L (ref 21–32)
CREAT BLD-MCNC: 0.82 MG/DL
EGFRCR SERPLBLD CKD-EPI 2021: 90 ML/MIN/1.73M2 (ref 60–?)
EOSINOPHIL # BLD AUTO: 0.06 X10(3) UL (ref 0–0.7)
EOSINOPHIL NFR BLD AUTO: 0.9 %
ERYTHROCYTE [DISTWIDTH] IN BLOOD BY AUTOMATED COUNT: 12.5 %
FASTING PATIENT LIPID ANSWER: YES
FASTING STATUS PATIENT QL REPORTED: YES
GLOBULIN PLAS-MCNC: 3.3 G/DL (ref 2.8–4.4)
GLUCOSE BLD-MCNC: 88 MG/DL (ref 70–99)
GLUCOSE UR STRIP.AUTO-MCNC: NORMAL MG/DL
HCT VFR BLD AUTO: 45.7 %
HDLC SERPL-MCNC: 98 MG/DL (ref 40–59)
HGB BLD-MCNC: 15.2 G/DL
IMM GRANULOCYTES # BLD AUTO: 0.02 X10(3) UL (ref 0–1)
IMM GRANULOCYTES NFR BLD: 0.3 %
KETONES UR STRIP.AUTO-MCNC: NEGATIVE MG/DL
LDLC SERPL CALC-MCNC: 68 MG/DL (ref ?–100)
LEUKOCYTE ESTERASE UR QL STRIP.AUTO: NEGATIVE
LYMPHOCYTES # BLD AUTO: 1.8 X10(3) UL (ref 1–4)
LYMPHOCYTES NFR BLD AUTO: 27.1 %
MCH RBC QN AUTO: 30.6 PG (ref 26–34)
MCHC RBC AUTO-ENTMCNC: 33.3 G/DL (ref 31–37)
MCV RBC AUTO: 92.1 FL
MONOCYTES # BLD AUTO: 0.48 X10(3) UL (ref 0.1–1)
MONOCYTES NFR BLD AUTO: 7.2 %
NEUTROPHILS # BLD AUTO: 4.26 X10 (3) UL (ref 1.5–7.7)
NEUTROPHILS # BLD AUTO: 4.26 X10(3) UL (ref 1.5–7.7)
NEUTROPHILS NFR BLD AUTO: 64 %
NITRITE UR QL STRIP.AUTO: NEGATIVE
NONHDLC SERPL-MCNC: 79 MG/DL (ref ?–130)
OSMOLALITY SERPL CALC.SUM OF ELEC: 283 MOSM/KG (ref 275–295)
PH UR STRIP.AUTO: 7.5 [PH] (ref 5–8)
PLATELET # BLD AUTO: 221 10(3)UL (ref 150–450)
POTASSIUM SERPL-SCNC: 4 MMOL/L (ref 3.5–5.1)
PROT SERPL-MCNC: 7 G/DL (ref 6.4–8.2)
PROT UR STRIP.AUTO-MCNC: NEGATIVE MG/DL
RBC # BLD AUTO: 4.96 X10(6)UL
RBC UR QL AUTO: NEGATIVE
SODIUM SERPL-SCNC: 137 MMOL/L (ref 136–145)
SP GR UR STRIP.AUTO: 1.01 (ref 1–1.03)
TRIGL SERPL-MCNC: 53 MG/DL (ref 30–149)
TSI SER-ACNC: 1.37 MIU/ML (ref 0.36–3.74)
UROBILINOGEN UR STRIP.AUTO-MCNC: NORMAL MG/DL
VLDLC SERPL CALC-MCNC: 8 MG/DL (ref 0–30)
WBC # BLD AUTO: 6.7 X10(3) UL (ref 4–11)

## 2023-10-13 PROCEDURE — 77063 BREAST TOMOSYNTHESIS BI: CPT | Performed by: NURSE PRACTITIONER

## 2023-10-13 PROCEDURE — 80061 LIPID PANEL: CPT

## 2023-10-13 PROCEDURE — 77067 SCR MAMMO BI INCL CAD: CPT | Performed by: NURSE PRACTITIONER

## 2023-10-13 PROCEDURE — 3078F DIAST BP <80 MM HG: CPT | Performed by: NURSE PRACTITIONER

## 2023-10-13 PROCEDURE — 3008F BODY MASS INDEX DOCD: CPT | Performed by: NURSE PRACTITIONER

## 2023-10-13 PROCEDURE — 84443 ASSAY THYROID STIM HORMONE: CPT

## 2023-10-13 PROCEDURE — 85025 COMPLETE CBC W/AUTO DIFF WBC: CPT

## 2023-10-13 PROCEDURE — 81001 URINALYSIS AUTO W/SCOPE: CPT

## 2023-10-13 PROCEDURE — 99396 PREV VISIT EST AGE 40-64: CPT | Performed by: NURSE PRACTITIONER

## 2023-10-13 PROCEDURE — 80053 COMPREHEN METABOLIC PANEL: CPT

## 2023-10-13 PROCEDURE — 36415 COLL VENOUS BLD VENIPUNCTURE: CPT

## 2023-10-13 PROCEDURE — 3074F SYST BP LT 130 MM HG: CPT | Performed by: NURSE PRACTITIONER

## 2023-10-16 ENCOUNTER — TELEPHONE (OUTPATIENT)
Dept: PHYSICAL THERAPY | Facility: HOSPITAL | Age: 48
End: 2023-10-16

## 2023-10-25 ENCOUNTER — TELEPHONE (OUTPATIENT)
Dept: PHYSICAL THERAPY | Facility: HOSPITAL | Age: 48
End: 2023-10-25

## 2023-10-26 ENCOUNTER — OFFICE VISIT (OUTPATIENT)
Dept: PHYSICAL THERAPY | Age: 48
End: 2023-10-26
Attending: NURSE PRACTITIONER

## 2023-10-26 DIAGNOSIS — M25.511 CHRONIC RIGHT SHOULDER PAIN: Primary | ICD-10-CM

## 2023-10-26 DIAGNOSIS — G89.29 CHRONIC RIGHT SHOULDER PAIN: Primary | ICD-10-CM

## 2023-10-26 PROCEDURE — 97161 PT EVAL LOW COMPLEX 20 MIN: CPT

## 2023-10-26 PROCEDURE — 97110 THERAPEUTIC EXERCISES: CPT

## 2023-11-01 NOTE — PROGRESS NOTES
PT DAILY NOTE  Diagnosis:   Chronic right shoulder pain (N03.160,I35.15)   Referring Provider: Sb KANG Date of Evaluation:    10/26/2023    Precautions:  None Next MD visit:   none scheduled  Date of Surgery: n/a  Symptom onset: June 2023 catching herself after slipping hiking     Insurance Primary/Secondary: [employer]     Visit 2 of 5   Date POC Expires: 12-31-24       Subjective: Pt states the ex's are ok, but other than that she is taking ibuprofen and avoiding use of RUE as much as she can. It was really bothering her when she was working this morning. Pain: 5/10   @eval:Flaquita Galvan is a 41year old female who presents to therapy today with complaints of R shld pain since she fell onto outstretched hand (she thinks with arm behind her) after slipping on a rock when hiking in June 2023. 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 Worse at end of day, reaching into back seat of car, 'it doesn't want to move.' Pt is R hand dominant. She has decreased use of RUE for some activities to avoid exacerbation. Denies any previous episodes of pain or any previous injuries. Denies any N/T RUE. She denies any improvement since the initial injury. PLOF hand weights at home. She has been unable to use the large mop at work if something spills. Some discomfort c dressing. Pt describes pain level at best 1/10, at worst 6/10. Current functional limitations include discomfort c dressing, disturbed sleep, decr tolerance for heavy cleaning, pain reaching into back seat of car, has stopped light weightlifting. Precious Delcid describes prior level of function full and painfree ADLs/cleaning/work tasks, undisturbed sleep, able to lift light weights.  Pt goals include recover full ROM in Rt shld, resolve pain, resume resistive activities. Past medical history was reviewed with Cristian Wilson. Significant findings include PCOS, celiac disease, breast fibroadenoma R    Objective:   Difficulty tolerating some ex's 2o pain today, detailed below    Treatment:  (\"NP\" indicates Not Performed this date)  Manual Therapy: Added STM R UT, levator, anterior delt/biceps  Added AP R GH jt gr 1-2    Neuromuscular Re-education:    Therapeutic Exercise: Attempted retro UBE for scap retraction but d /c 2o incr pain  Attempted supine 'T' stretch but d /c 2o pain  added supine RUE stab at 90deg flexion vs L shld horiz abd YTB x10  added supine RUE stab at 90deg flexion vs L shld ext YTB x10  Seated scap squeeze 5\"x20  Doorway stretch (arms low) 10\"x3  added submax isometric R shld extension ball on wall 5\"x10  added submax isometric R shld flexion ball on wall 5\"x10  attempted R shld abduction ball submax isometric but UA 2o pain    HEP:  Access Code: [email or web address] ; URL: [employer]/  Date: 10/26/2023 ; Prepared by: Julianna Winter  - Seated Scapular Retraction  - 2 x daily - 2 sets - 20 reps - 5 hold  - Standing Row with Resistance  - 2 x daily - 15 reps - 5 hold  - Corner Pec Minor Stretch  - 2 x daily - 3 reps - 10 hold  11-2: add supine R shld stabilize at 90 deg flexion vs LUE horiz abd, ext c YTB ; same position but R arm horiz abd, ext c YTB    Assessment/Plan: Pt reports moderate pain today, and she has been off work for several hours. She has pain c movement/use of RUE. She was unable to perform some attempted ex's today due to pain- retro upper body ergometer, supine pec T stretch, submax isometric shoulder abd at wall. Added manual tx to assist c decreasing muscle tightness and trigger points that contribute to decr mechanics at shoulder c reaching activities. She did tolerate some new ex's today to promote stabilization R shld and deltoid activation. Some additions to HEP as noted above.  Continue to monitor symptoms and address deficits. PLAN OF CARE:    Goals: (to be met in 8 visits)   Consistently decr pain < or = 1/10 for incr QOL and incr activity tolerance  Overall incr in function as indicated by decr QuickDASH at least 10 pts  Painfree AROM R shld for painfree ADLs  Incr R RTC and scapular strength at least 1/2 grade painfree to improve shoulder mechanics c overhead reaching for decr symptoms  Pt tolerates lifting at least 10#  and heavy cleaning at work without pain for PLOF  Indep HEP to promote cont progress toward functional goals    Frequency / Duration: Patient will be seen for 1-2 x/week or a total of 8 visits over a 90 day period. All Treatments performed at distinct and separate times during the therapy session.   Treatment Minutes  Units charged   Manual Therapy 20 minutes 1   Therapeutic Activity 0 minutes    Neuromuscular Re-education 0 minutes    Therapeutic Exercise 25 minutes 2   Total Direct Treatment Time 45 minutes

## 2023-11-02 ENCOUNTER — OFFICE VISIT (OUTPATIENT)
Dept: PHYSICAL THERAPY | Age: 48
End: 2023-11-02
Attending: NURSE PRACTITIONER
Payer: COMMERCIAL

## 2023-11-02 DIAGNOSIS — M25.511 CHRONIC RIGHT SHOULDER PAIN: Primary | ICD-10-CM

## 2023-11-02 DIAGNOSIS — G89.29 CHRONIC RIGHT SHOULDER PAIN: Primary | ICD-10-CM

## 2023-11-02 PROCEDURE — 97140 MANUAL THERAPY 1/> REGIONS: CPT

## 2023-11-02 PROCEDURE — 97110 THERAPEUTIC EXERCISES: CPT

## 2023-11-07 ENCOUNTER — OFFICE VISIT (OUTPATIENT)
Dept: PHYSICAL THERAPY | Age: 48
End: 2023-11-07
Attending: NURSE PRACTITIONER
Payer: COMMERCIAL

## 2023-11-07 PROCEDURE — 97140 MANUAL THERAPY 1/> REGIONS: CPT

## 2023-11-07 PROCEDURE — 97110 THERAPEUTIC EXERCISES: CPT

## 2023-11-07 NOTE — PROGRESS NOTES
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 Patient reports improved symptoms ending session  Pain: 4/10 at rest beginning session, 0/10 end session but still painful when elevating RUE  @eval:Flaquita Galvna is a 41year old female who presents to therapy today with complaints of R shld pain since she fell onto outstretched hand (she thinks with arm behind her) after slipping on a rock when hiking in June 2023. 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 Worse at end of day, reaching into back seat of car, 'it doesn't want to move.' Pt is R hand dominant. She has decreased use of RUE for some activities to avoid exacerbation. Denies any previous episodes of pain or any previous injuries. Denies any N/T RUE. She denies any improvement since the initial injury. PLOF hand weights at home. She has been unable to use the large mop at work if something spills. Some discomfort c dressing. Pt describes pain level at best 1/10, at worst 6/10. Current functional limitations include discomfort c dressing, disturbed sleep, decr tolerance for heavy cleaning, pain reaching into back seat of car, has stopped light weightlifting.    Thedodedra Mosher describes prior level of function full and painfree ADLs/cleaning/work tasks, undisturbed sleep, able to lift light weights. Pt goals include recover full ROM in Rt shld, resolve pain, resume resistive activities. Past medical history was reviewed with Magen Jones. Significant findings include PCOS, celiac disease, breast fibroadenoma R    Objective:   Patient demonstrates tendency to \"over do\" exercises that seem to contribute to increased symptoms. Requires cues for performing pain free and gentle stretching/muscle firing. Painful with IR passive or active, ER pain free mostly     Treatment:  (\"NP\" indicates Not Performed this date)  Manual Therapy:  STM R UT, levator, anterior delt/biceps, Pec haris/min, Cervical Paraspinals   AP R GH jt gr 1-2    Neuromuscular Re-education:    Therapeutic Exercise: Added Passive Pec Stretch in Supine 20\" H x10  Added Prone Scapular Retraction x20 5\" H   supine RUE stab at 90deg flexion vs L shld horiz abd RTB x20 (Incr)  supine RUE stab at 90deg flexion vs L shld ext YTB x20 (Incr)  Seated scap squeeze 5\"x20 (Hands folded in front on lap to reduce shoulder extension compensation)  Unilateral (Modified) Doorway stretch (arm low) 20\"x5 (Incr)  submax isometric R shld extension ball on wall 10\"x10 (Incr)  submax isometric R shld flexion ball on wall 10\"x10 (Incr)     Try next visit-attempted R shld abduction ball submax isometric but UA 2o pain     NP  Attempted retro UBE for scap retraction but d /c 2o incr pain  Attempted supine 'T' stretch but d /c 2o pain    Future Visits: Trial Supine HA, LT Wall Setting (maybe trial lift), FR wall slides, 3 way Scap- Standing Rows, Upright Row, Shoulder Ex; Seated [employer], Seated [employer], Cervical Chin Tucks Ball on Wall Iso    HEP:  Access Code: [email or web address] ; URL: [email or web address]  Date: 10/26/2023 ; Prepared by: Chrissy Jauregui  - Seated Scapular Retraction  - 2 x daily - 2 sets - 20 reps - 5 hold  - Standing Row with Resistance  - 2 x daily - 15 reps - 5 hold  - Corner Pec Minor Stretch  - 2 x daily - 3 reps - 10 hold  11-2: add supine R shld stabilize at 90 deg flexion vs LUE horiz abd, ext c YTB ; same position but R arm horiz abd, ext c YTB    Assessment/Plan: Patient tolerated exercises and overall treatment better on this day than previous session. Continued manual of RUE with focus of pec major and anterior deltoid, implemented GT to biceps insertion for improved symptoms in response to rotational movements, still painful wit shoulder IR but overall reported decreased pain/radiating symptoms at rest. Patient required cues for reduced effort for unilateral pec stretch and for emphasis on submaximal pressure for isometric exercises to avoid flare up. Patient demo significant protraction of right scapula/anterior translation of humerus in [address]. Patient would benefit from continued postural strengthening as tolerated    PLAN OF CARE:    Goals: (to be met in 8 visits)   Consistently decr pain < or = 1/10 for incr QOL and incr activity tolerance  Overall incr in function as indicated by decr QuickDASH at least 10 pts  Painfree AROM R shld for painfree ADLs  Incr R RTC and scapular strength at least 1/2 grade painfree to improve shoulder mechanics c overhead reaching for decr symptoms  Pt tolerates lifting at least 10#  and heavy cleaning at work without pain for PLOF  Indep HEP to promote cont progress toward functional goals    Frequency / Duration: Patient will be seen for 1-2 x/week or a total of 8 visits over a 90 day period. All Treatments performed at distinct and separate times during the therapy session.   Treatment Minutes  Units charged   Manual Therapy 20 minutes 1   Therapeutic Activity 0 minutes    Neuromuscular Re-education 0 minutes    Therapeutic Exercise 25 minutes 2   Total Direct Treatment Time 45 minutes

## 2023-11-08 ENCOUNTER — APPOINTMENT (OUTPATIENT)
Dept: PHYSICAL THERAPY | Age: 48
End: 2023-11-08
Attending: NURSE PRACTITIONER
Payer: COMMERCIAL

## 2023-11-15 ENCOUNTER — OFFICE VISIT (OUTPATIENT)
Dept: PHYSICAL THERAPY | Age: 48
End: 2023-11-15
Attending: NURSE PRACTITIONER
Payer: COMMERCIAL

## 2023-11-15 ENCOUNTER — APPOINTMENT (OUTPATIENT)
Dept: PHYSICAL THERAPY | Age: 48
End: 2023-11-15
Attending: NURSE PRACTITIONER
Payer: COMMERCIAL

## 2023-11-15 PROCEDURE — 97140 MANUAL THERAPY 1/> REGIONS: CPT

## 2023-11-15 PROCEDURE — 97110 THERAPEUTIC EXERCISES: CPT

## 2023-11-15 NOTE — PROGRESS NOTES
PT DAILY NOTE  Diagnosis:   Chronic right shoulder pain (Q28.061,C34.02)   Referring Provider: Mary KANG Date of Evaluation:    10/26/2023    Precautions:  None Next MD visit:   none scheduled  Date of Surgery: n/a  Symptom onset: June 2023 catching herself after slipping hiking     Insurance Primary/Secondary: [facility]     Visit 4 of 5 11/15/2023  Date POC Expires: 12-31-24     Subjective: Patient states pain has been better since previous session, but still limited with reaching behind her/behind the back, and externally rotating shoulder. Patient states first thing in the morning pain was 3-4/10 and usually is when she wakes up though. Pain: 1/10 beginning session; 4/10 after shoulder abduction isometric  @eval:Flaquita Galvan is a 41year old female who presents to therapy today with complaints of R shld pain since she fell onto outstretched hand (she thinks with arm behind her) after slipping on a rock when hiking in June 2023. 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 Worse at end of day, reaching into back seat of car, 'it doesn't want to move.' Pt is R hand dominant. She has decreased use of RUE for some activities to avoid exacerbation. Denies any previous episodes of pain or any previous injuries. Denies any N/T RUE. She denies any improvement since the initial injury. PLOF hand weights at home. She has been unable to use the large mop at work if something spills. Some discomfort c dressing. Pt describes pain level at best 1/10, at worst 6/10. Current functional limitations include discomfort c dressing, disturbed sleep, decr tolerance for heavy cleaning, pain reaching into back seat of car, has stopped light weightlifting.    [place] describes prior level of function full and painfree ADLs/cleaning/work tasks, undisturbed sleep, able to lift light weights. Pt goals include recover full ROM in Rt shld, resolve pain, resume resistive activities. Past medical history was reviewed with Juan Nix. Significant findings include PCOS, celiac disease, breast fibroadenoma R    Objective:  AROM R Shoulder Flexion: 162 deg   AROM R Shoulder Abduction: 173 deg    Treatment:  (\"NP\" indicates Not Performed this date)  Manual Therapy: 15'  STM R UT, levator, anterior delt/biceps, Pec haris/min, Cervical Paraspinals   AP R GH jt gr 1-2  Added GH lateral distraction with posterior glide     Neuromuscular Re-education:    Therapeutic Exercise:3  Passive Pec Stretch in Supine 20\" H x10  Seated scap squeeze 5\"x20 (Hands folded in front on lap to reduce shoulder extension compensation)  Prone Scapular Retraction x20 5\" H   supine RUE stab at 90deg flexion vs L shld horiz abd RTB x20   supine RUE stab at 90deg flexion vs L shld ext RTB x20   submax isometric R shld extension 10\"x10   submax isometric R shld flexion 10\"x10  Added submax isometric R shld abduction 10x10\" (\"felt more than any other isometric)   Unilateral Doorway stretch (arm low) 20\"x5   Added Seated Table ER PROM 10x10\"     Try next visit- Seated [employer], update HEP    NP  Attempted retro UBE for scap retraction but d /c 2o incr pain  Attempted supine 'T' stretch but d /c 2o pain    Future Visits: Trial Supine HA, LT Wall Setting (maybe trial lift), FR wall slides, 3 way Scap- Standing Rows, Upright Row, Shoulder Ex; Seated [employer], Seated [employer], Cervical Chin Tucks Ball on Wall Iso    HEP:  Access Code: [email or web address] ; URL: [email or web address]  Date: 10/26/2023 ; Prepared by: Jeannie Rodriguez  - Seated Scapular Retraction  - 2 x daily - 2 sets - 20 reps - 5 hold  - Standing Row with Resistance  - 2 x daily - 15 reps - 5 hold  - Corner Pec Minor Stretch  - 2 x daily - 3 reps - 10 hold  11-2: add supine R shld stabilize at 90 deg flexion vs LUE horiz abd, ext c YTB ; same position but R arm horiz abd, ext c YTB    Assessment/Plan: Patient presenting with significant improvement in pain since previous session per behavior/subjective reports, but with increased soreness following shoulder abduction isometric exercise added on this day. Patient tolerated all exercises well overall and did tolerate abduction more easily, but fatigued significantly resulting in immediate soreness. Added PROM table ER as well on this day to address rotational restrictions in pain free range. Plan to update HEP and reassess for 5/5 visits next session    Include in updated HEP: Shoulder flex, Shoulder Ext, (maybe) Shoulder Abduction Wall Isometrics, TB rows, seated scap retractions, unilateral low position doorway pec stretch, PROM table ER     PLAN OF CARE:    Goals: (to be met in 8 visits)   Consistently decr pain < or = 1/10 for incr QOL and incr activity tolerance  Overall incr in function as indicated by decr QuickDASH at least 10 pts  Painfree AROM R shld for painfree ADLs  Incr R RTC and scapular strength at least 1/2 grade painfree to improve shoulder mechanics c overhead reaching for decr symptoms  Pt tolerates lifting at least 10#  and heavy cleaning at work without pain for PLOF  Indep HEP to promote cont progress toward functional goals    Frequency / Duration: Patient will be seen for 1-2 x/week or a total of 8 visits over a 90 day period. All Treatments performed at distinct and separate times during the therapy session.   Treatment Minutes  Units charged   Manual Therapy 10 minutes 1   Therapeutic Activity 0 minutes    Neuromuscular Re-education 0 minutes    Therapeutic Exercise 38 minutes 3   Total Direct Treatment Time 48 minutes

## 2023-11-20 ENCOUNTER — APPOINTMENT (OUTPATIENT)
Dept: PHYSICAL THERAPY | Age: 48
End: 2023-11-20
Attending: NURSE PRACTITIONER
Payer: COMMERCIAL

## 2023-11-21 ENCOUNTER — OFFICE VISIT (OUTPATIENT)
Dept: PHYSICAL THERAPY | Age: 48
End: 2023-11-21
Attending: NURSE PRACTITIONER
Payer: COMMERCIAL

## 2023-11-21 PROCEDURE — 97110 THERAPEUTIC EXERCISES: CPT

## 2023-11-21 NOTE — PROGRESS NOTES
PT DAILY NOTE  Diagnosis:   Chronic right shoulder pain (M25.511,G89.29)   Referring Provider: Brady KANG Date of Evaluation:    10/26/2023    Precautions:  None Next MD visit:   none scheduled  Date of Surgery: n/a  Symptom onset: June 2023 catching herself after slipping hiking     Insurance Primary/Secondary: [employer]     Visit 4 of 5 11/15/2023  Date POC Expires: 12-31-24     Subjective: Patient states overall morning pain has been moderate due to often sleeping with the effected arm above her head without trying to, patient states it gets better throughout the day but has continued to have consistent \"nagging\" pain at rest, rating 1-2/10 but says this is pretty much constant. Pain: 2/10 beginning session;   José Luis Wayne is a 41year old female who presents to therapy today with complaints of R shld pain since she fell onto outstretched hand (she thinks with arm behind her) after slipping on a rock when hiking in June 2023. 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 Worse at end of day, reaching into back seat of car, 'it doesn't want to move.' Pt is R hand dominant. She has decreased use of RUE for some activities to avoid exacerbation. Denies any previous episodes of pain or any previous injuries. Denies any N/T RUE. She denies any improvement since the initial injury. PLOF hand weights at home. She has been unable to use the large mop at work if something spills. Some discomfort c dressing. Pt describes pain level at best 1/10, at worst 6/10. Current functional limitations include discomfort c dressing, disturbed sleep, decr tolerance for heavy cleaning, pain reaching into back seat of car, has stopped light weightlifting.    Ana Luisa Perrinmateo describes prior level of function full and painfree ADLs/cleaning/work tasks, undisturbed sleep, able to lift light weights. Pt goals include recover full ROM in Rt shld, resolve pain, resume resistive activities. Past medical history was reviewed with Jany Zayas. Significant findings include PCOS, celiac disease, breast fibroadenoma R    Objective:  AROM R Shoulder Flexion: 162 deg   AROM R Shoulder Abduction: 173 deg    Treatment:  (\"NP\" indicates Not Performed this date)  Manual Therapy: NP  STM R UT, levator, anterior delt/biceps, Pec haris/min, Cervical Paraspinals   AP R GH jt gr 1-2  Added GH lateral distraction with posterior glide     Neuromuscular Re-education:    Therapeutic Exercise:45'  Added Retro Sci Fit UE 3'  Unilateral Doorway stretch (arm low) 20\"x5 ea R/L   Passive Pec Stretch in Supine 20\" H x10  supine RUE stab at 90deg flexion vs L shld horiz abd RTB x20   supine RUE stab at 90deg flexion vs L shld ext RTB x20   Added supine RUE stab at 90deg flexion vs L shld horiz HA RTB x20   Added supine RUE stab at 90deg flexion vs L shld HA RTB x20 (painful w Hor Add but not severe)  Prone Scapular Retraction x20 5\" H   Seated scap squeeze 5\"x20 (Hands folded in front on lap to reduce shoulder extension compensation)  submax isometric R shld extension 10\"x10   submax isometric R shld flexion 10\"x10  submax isometric R shld abduction 10x10\" (\"felt more than any other isometric)   Updated HEP    NP  Added Seated Table ER PROM 10x10\"     Try next visit- Seated [employer], update HEP    NP  Attempted retro UBE for scap retraction but d /c 2o incr pain  Attempted supine 'T' stretch but d /c 2o pain    Future Visits: LT Wall Setting (maybe trial lift), FR wall slides, 3 way Scap- Standing Rows, Upright Row, Shoulder Ex; Seated [employer], Seated [employer], Cervical Chin Tucks Ball on Wall Iso    HEP:  Access Code: [email or web address] ; URL: [email or web address]  Date: 10/26/2023 ; Prepared by: Maribell Murrell  - Seated Scapular Retraction  - 2 x daily - 2 sets - 20 reps - 5 hold  - Standing Row with Resistance  - 2 x daily - 15 reps - 5 hold  - Corner Pec Minor Stretch  - 2 x daily - 3 reps - 10 hold  11-2: add supine R shld stabilize at 90 deg flexion vs LUE horiz abd, ext c YTB ; same position but R arm horiz abd, ext c YTB    Access Code: [email or web address]  URL: [email or web address]  Date: 11/21/2023  Prepared by: Lino Ramp    Exercises  - Seated Scapular Retraction  - 2 x daily - 2 sets - 20 reps - 5 hold  - Standing Row with Resistance  - 2 x daily - 15 reps - 5 hold  - Supine Shoulder Horizontal Abduction with Resistance  - 2 x daily - 2 sets - 10 reps  added- Isometric Shoulder Flexion at Wall  - 1 x daily - 7 x weekly - 1 sets - 10 reps - 10s hold  added- Isometric Shoulder Extension at Wall  - 1 x daily - 7 x weekly - 1 sets - 10 reps - 10s hold  added- Isometric Shoulder Abduction at Wall  - 1 x daily - 7 x weekly - 1 sets - 10 reps - 10s hold  modified- Doorway Pec Stretch at 60 Degrees Abduction with Arm Straight  - 3 x daily - 7 x weekly - 5 sets - 20s hold    Assessment/Plan: Patient continues to present with lower pain levels and tolerated added supine unilateral HA with only mild pain reported for eccentric phase. Patient provided with updated HEP to include 3 way deltoid isometrics as patient tolerated isometric abduction better this session.  Patient would benefit from beginning lower trap strengthening next visit and continue to address deltoid weakness    PLAN OF CARE:    Goals: (to be met in 8 visits)   Consistently decr pain < or = 1/10 for incr QOL and incr activity tolerance  Overall incr in function as indicated by decr QuickDASH at least 10 pts  Painfree AROM R shld for painfree ADLs  Incr R RTC and scapular strength at least 1/2 grade painfree to improve shoulder mechanics c overhead reaching for decr symptoms  Pt tolerates lifting at least 10#  and heavy cleaning at work without pain for PLOF  Indep HEP to promote cont progress toward functional goals    Frequency / Duration: Patient will be seen for 1-2 x/week or a total of 8 visits over a 90 day period. All Treatments performed at distinct and separate times during the therapy session.   Treatment Minutes  Units charged   Manual Therapy 0 minutes    Therapeutic Activity 0 minutes    Neuromuscular Re-education 0 minutes    Therapeutic Exercise 45 minutes 3   Total Direct Treatment Time 45 minutes

## 2023-11-22 ENCOUNTER — APPOINTMENT (OUTPATIENT)
Dept: PHYSICAL THERAPY | Age: 48
End: 2023-11-22
Attending: NURSE PRACTITIONER
Payer: COMMERCIAL

## 2023-12-11 ENCOUNTER — APPOINTMENT (OUTPATIENT)
Dept: PHYSICAL THERAPY | Age: 48
End: 2023-12-11
Attending: NURSE PRACTITIONER
Payer: COMMERCIAL

## 2024-11-12 ENCOUNTER — MED REC SCAN ONLY (OUTPATIENT)
Dept: INTERNAL MEDICINE CLINIC | Facility: CLINIC | Age: 49
End: 2024-11-12

## 2025-01-31 ENCOUNTER — HOSPITAL ENCOUNTER (OUTPATIENT)
Dept: MAMMOGRAPHY | Age: 50
Discharge: HOME | End: 2025-01-31
Attending: NURSE PRACTITIONER
Payer: COMMERCIAL

## 2025-01-31 DIAGNOSIS — Z12.31 ENCOUNTER FOR SCREENING MAMMOGRAM FOR MALIGNANT NEOPLASM OF BREAST: ICD-10-CM

## 2025-01-31 PROCEDURE — 77063 BREAST TOMOSYNTHESIS BI: CPT | Performed by: NURSE PRACTITIONER

## 2025-01-31 PROCEDURE — 77067 SCR MAMMO BI INCL CAD: CPT | Performed by: NURSE PRACTITIONER

## (undated) DIAGNOSIS — F34.1 DYSTHYMIA: ICD-10-CM

## (undated) DIAGNOSIS — E28.2 PCOS (POLYCYSTIC OVARIAN SYNDROME): ICD-10-CM

## (undated) DEVICE — FILTERLINE NASAL ADULT O2/CO2

## (undated) DEVICE — 1200CC GUARDIAN II: Brand: GUARDIAN

## (undated) DEVICE — Device: Brand: DEFENDO AIR/WATER/SUCTION AND BIOPSY VALVE

## (undated) DEVICE — ENDOSCOPY PACK UPPER: Brand: MEDLINE INDUSTRIES, INC.

## (undated) DEVICE — ENDOSCOPY PACK - LOWER: Brand: MEDLINE INDUSTRIES, INC.

## (undated) DEVICE — 3M™ RED DOT™ MONITORING ELECTRODE WITH FOAM TAPE AND STICKY GEL, 50/BAG, 20/CASE, 72/PLT 2570: Brand: RED DOT™

## (undated) DEVICE — FORCEP BIOPSY RJ4 LG CAP W/ND

## (undated) NOTE — Clinical Note
I had the pleasure of seeing Janina Arguello on 1/30/2019. Please see my attached note. Isauro Larios MD [facility]--Surgery

## (undated) NOTE — ED AVS SNAPSHOT
Kate Sanchez   MRN: [id number]    Department:  [facility] Emergency Department   Date of Visit:  6/18/2019           Disclosure     Insurance plans vary and the physician(s) referred by the ER may not be covered by your plan.  Please contact you tell this physician (or your personal doctor if your instructions are to return to your personal doctor) about any new or lasting problems. The primary care or specialist physician will see patients referred from the [facility] Emergency Department.  Wing Tineo

## (undated) NOTE — Clinical Note
I had the pleasure of seeing [place] on 12/10/2018. Please see my attached note. Tr Preston MD [facility]--Surgery

## (undated) NOTE — MR AVS SNAPSHOT
After Visit Summary   5/24/2021    Yovani Jamil    MRN: [id number]           Visit Information     Date & Time  5/24/2021  5:30 PM Provider  [facility]  [place] [address], [facility].  Phone  [phone] [[id number] CUSTOM]  5/24/2021 (Approximate) 5/24/2022     KIDNEY/BLADDER (ZKA=[id number]) [26903 CPT(R)]  5/24/2021 (Approximate) 5/24/2022      Follow-up Instructions    Return in about 1 year (around 5/24/2022) for routine physical, or sooner as needed.      I injury that does not require immediate attention VIDEO VISITS  Average cost  $35*    e-VISTS  Average cost  $35*     SAME DAY APPOINTMENTS   Available at primary care offices    [address] [place] [address]  OFFICE VISIT   Primary Care Providers  Treatment fo